# Patient Record
Sex: MALE | Race: WHITE | NOT HISPANIC OR LATINO | Employment: FULL TIME | URBAN - METROPOLITAN AREA
[De-identification: names, ages, dates, MRNs, and addresses within clinical notes are randomized per-mention and may not be internally consistent; named-entity substitution may affect disease eponyms.]

---

## 2018-05-29 ENCOUNTER — OFFICE VISIT (OUTPATIENT)
Dept: PRIMARY CARE CLINIC | Facility: CLINIC | Age: 33
End: 2018-05-29
Payer: COMMERCIAL

## 2018-05-29 VITALS
BODY MASS INDEX: 29.32 KG/M2 | HEART RATE: 77 BPM | RESPIRATION RATE: 12 BRPM | SYSTOLIC BLOOD PRESSURE: 123 MMHG | WEIGHT: 221.25 LBS | HEIGHT: 73 IN | OXYGEN SATURATION: 97 % | TEMPERATURE: 99 F | DIASTOLIC BLOOD PRESSURE: 76 MMHG

## 2018-05-29 DIAGNOSIS — Z00.00 ANNUAL PHYSICAL EXAM: ICD-10-CM

## 2018-05-29 DIAGNOSIS — E78.5 HYPERLIPIDEMIA, UNSPECIFIED HYPERLIPIDEMIA TYPE: ICD-10-CM

## 2018-05-29 DIAGNOSIS — E66.3 OVERWEIGHT (BMI 25.0-29.9): ICD-10-CM

## 2018-05-29 DIAGNOSIS — R74.01 ELEVATED ALT MEASUREMENT: ICD-10-CM

## 2018-05-29 DIAGNOSIS — M25.50 PAIN IN JOINT INVOLVING MULTIPLE SITES: ICD-10-CM

## 2018-05-29 DIAGNOSIS — R10.13 EPIGASTRIC PAIN: Primary | ICD-10-CM

## 2018-05-29 PROBLEM — J45.990 EXERCISE-INDUCED ASTHMA: Status: ACTIVE | Noted: 2018-05-29

## 2018-05-29 PROBLEM — J30.89 ENVIRONMENTAL AND SEASONAL ALLERGIES: Status: ACTIVE | Noted: 2018-05-29

## 2018-05-29 PROCEDURE — 3008F BODY MASS INDEX DOCD: CPT | Mod: CPTII,S$GLB,, | Performed by: NURSE PRACTITIONER

## 2018-05-29 PROCEDURE — 99999 PR PBB SHADOW E&M-NEW PATIENT-LVL IV: CPT | Mod: PBBFAC,,, | Performed by: NURSE PRACTITIONER

## 2018-05-29 PROCEDURE — 99203 OFFICE O/P NEW LOW 30 MIN: CPT | Mod: S$GLB,,, | Performed by: NURSE PRACTITIONER

## 2018-05-29 RX ORDER — IBUPROFEN 200 MG
200 TABLET ORAL
COMMUNITY

## 2018-05-29 NOTE — PROGRESS NOTES
"Subjective:       Patient ID: Aron Thao is a 33 y.o. male.    Chief Complaint: Abdominal Pain (Pulled muscle ,possible hiernia )    Patient is a 33 year old male who presents to clinic today requesting to establish as a primary care patient and to undergo his annual physical exam however, he has acute complaints of "hernia" of mid-abdomen.  He states he thought he pulled a muscle but has noted pain in his mid upper abdomen and also just above his umbilicus.  He has been experiencing pain intermittently for over a year worse in the last 2 months.  He does experience nausea at times with the pain and localized tenderness with "swelling" above his umbilicus but does not vomit and has had no changes in his bowel or bladder habits.  He denies any known blood in stool or urine.  No known trauma. He cannot identify any specific food trigger or GERD symptoms related to his epigastric pain.  He reports he has been routinely taking Ibuprofen for joint and muscle pain. He states he has joint pain daily.  He voices some concerns regarding his sister recently being diagnosed with Ehler Danlos syndrome.  He has a history of tendonitis of right shoulder but, no dermatologic complaints, no diagnosis of kyphosis or scoliosis, no joint hypermobility.  He denies any other acute complaints.     He was previously followed by Dr. Rahman for HLP but was lost to follow up.  He is due for labs.  He was previously followed by Dr. Osman for exercise induced asthma. He has not been using any asthma medications and has not experiences symptoms in years.  He has a history of allergies which are remedied by OTC supplements.  He has a history of elevated CRP and ALT. 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.  He is  and has 4 children living in [place].          Review of Systems   Constitutional: Negative.    HENT: Negative.    Eyes: Negative.    Respiratory: Negative.    Cardiovascular: Negative.    Gastrointestinal: Positive for " abdominal distention, abdominal pain and nausea. Negative for anal bleeding, blood in stool, constipation, diarrhea, rectal pain and vomiting.        Mid abdomen localized pain and swelling    Endocrine: Negative.    Genitourinary: Negative.    Musculoskeletal: Positive for arthralgias, joint swelling (ankle ) and myalgias. Negative for back pain, gait problem, neck pain and neck stiffness.        Ankles, shoulders, knees, wrists.     Skin: Negative.    Allergic/Immunologic: Positive for environmental allergies. Negative for food allergies and immunocompromised state.   Neurological: Negative.    Hematological: Negative.    Psychiatric/Behavioral: Negative.    All other systems reviewed and are negative.      Objective:      Physical Exam   Constitutional: He is oriented to person, place, and time. He appears well-developed and well-nourished. No distress.   HENT:   Head: Normocephalic and atraumatic.   Right Ear: External ear normal.   Left Ear: External ear normal.   Nose: Nose normal.   Mouth/Throat: Oropharynx is clear and moist. No oropharyngeal exudate.   Eyes: Conjunctivae are normal. Right eye exhibits no discharge. Left eye exhibits no discharge. No scleral icterus.   Neck: Normal range of motion. Neck supple.   Cardiovascular: Normal rate, regular rhythm, normal heart sounds and intact distal pulses.    No murmur heard.  Pulmonary/Chest: Effort normal and breath sounds normal. No respiratory distress.   Abdominal: Soft. Bowel sounds are normal. He exhibits no distension and no mass. There is tenderness. There is no rebound and no guarding. A hernia (approx 2cm separation of abdominal wall muscles mid abdomen superior to umbilicus consistent with small ventral hernia.  Mild pain with deep palpation.  No obvious buldge or mass. ) is present.   Musculoskeletal: Normal range of motion. He exhibits no edema, tenderness or deformity.   Lymphadenopathy:     He has no cervical adenopathy.   Neurological: He is  alert and oriented to person, place, and time. No cranial nerve deficit. He exhibits normal muscle tone. Coordination normal.   Skin: Skin is warm and dry. Capillary refill takes less than 2 seconds. No rash noted. He is not diaphoretic. No erythema.   No skin laxity   Psychiatric: He has a normal mood and affect. His behavior is normal.   Nursing note and vitals reviewed.      Assessment:       1. Epigastric pain    2. Pain in joint involving multiple sites    3. Elevated ALT measurement    4. Hyperlipidemia, unspecified hyperlipidemia type    5. Overweight (BMI 25.0-29.9)    6. Annual physical exam        Plan:       Epigastric pain  -     US Abdomen Complete; Future; Expected date: 05/29/2018  -     Amylase; Future; Expected date: 05/29/2018  -     Lipase; Future; Expected date: 05/29/2018  Patient verbalizes intermittent epigastric pain and above umbilicus.  Likely not related.  Given history of elevated ALT and routine use of NSAIDs will obtain US and labs.  Instructed patient if symptoms worsen in any way he should call for ASAp appt. Or report to the ED for immediate evaluation.  He was instructed to try to avoid use of NSAIDs and try to identify food trigger if any.  He may use OTC zantac for further symptom relief of epigastric pain and observe for effect.  He will be referred to general surgery for further evaluation for possible ventral hernia as appropriate.  Signs and symptoms of hernia incarceration discussed.   Pain in joint involving multiple sites  -     Rheumatoid factor; Future; Expected date: 05/29/2018  -     Sedimentation rate, manual; Future; Expected date: 05/29/2018  -     MARS; Future; Expected date: 05/29/2018    Elevated ALT measurement  -     CBC auto differential; Future; Expected date: 05/29/2018  -     Comprehensive metabolic panel; Future; Expected date: 05/29/2018  -     Lipid panel; Future; Expected date: 05/29/2018  -     URINALYSIS; Future; Expected date: 05/29/2018  -     US  Abdomen Complete; Future; Expected date: 05/29/2018  -     Amylase; Future; Expected date: 05/29/2018  -     Lipase; Future; Expected date: 05/29/2018    Hyperlipidemia, unspecified hyperlipidemia type  -     CBC auto differential; Future; Expected date: 05/29/2018  -     Comprehensive metabolic panel; Future; Expected date: 05/29/2018  -     Lipid panel; Future; Expected date: 05/29/2018  -     URINALYSIS; Future; Expected date: 05/29/2018    Overweight (BMI 25.0-29.9)  -     CBC auto differential; Future; Expected date: 05/29/2018  -     Comprehensive metabolic panel; Future; Expected date: 05/29/2018  -     Lipid panel; Future; Expected date: 05/29/2018  -     URINALYSIS; Future; Expected date: 05/29/2018    Annual physical exam  -     CBC auto differential; Future; Expected date: 05/29/2018  -     Comprehensive metabolic panel; Future; Expected date: 05/29/2018  -     Lipid panel; Future; Expected date: 05/29/2018  -     URINALYSIS; Future; Expected date: 05/29/2018      Medication List with Changes/Refills   Current Medications    IBUPROFEN (ADVIL,MOTRIN) 200 MG TABLET    Take 200 mg by mouth as needed for Pain (headaches).   Discontinued Medications    ALBUTEROL 90 MCG/ACTUATION HFAA    Inhale 2 puffs into the lungs every 6 (six) hours as needed.    BECLOMETHASONE (QVAR) 80 MCG/ACTUATION AERO    Inhale 1 puff into the lungs 2 (two) times daily.    LEVOCETIRIZINE (XYZAL) 5 MG TABLET    Take 1 tablet (5 mg total) by mouth every evening.    PRAVASTATIN (PRAVACHOL) 40 MG TABLET    Take 1 tablet (40 mg total) by mouth once daily.         I have reviewed the patient's past medical/surgical and social histories and updated as appropriate. Medications were reviewed and discussed as appropriate including side effects and risks versus benefit. Plan of care was reviewed and agreed upon with the patient.  An opportunity to ask questions was provided and explanation given. Patient verbalized understanding on all  information reviewed and discussed.  The patient will follow up  2 weeks with labs or sooner if needed. If symptoms worsen patient may call for ASAP appointment or report to the emergency department for further evaluation.

## 2018-05-29 NOTE — PATIENT INSTRUCTIONS
Hernia (Adult)    A hernia can happen when there is a weakness or defect in the wall of the abdomen or groin. Intestines or nearby tissues may move from their usual location and push through the weakness in the wall. This can cause a hernia (bulge) you may see or feel.  Causes and Risk Factors   A hernia may be present at birth. Or it may be caused by the wear and tear of daily living. Certain factors can make a hernia more likely. These can include:  · Heavy lifting  · Straining, whether from lifting, movement, or constipation  · Chronic cough  · Injury to the abdominal wall  · Excess weight  · Pregnancy  · Prior surgery  · Older age  · Family history of hernia  Symptoms  Symptoms of a hernia may come on suddenly. Or they may appear slowly over time. Some common symptoms include:  · Bulge in the groin area, around the navel, or in the scrotum (the bulge may get bigger when you stand and go away when you lie down)  · Pain or pressure around the bulge  · Pain during activities such as lifting, coughing, or sneezing  · A feeling of weakness or pressure in the groin  · Pain or swelling in the scrotum  Types of hernias  There are different types of hernia. The type you have depends on its location:  · Inguinal: This type is in the groin or scrotum. It is more common in men.  · Femoral: This type is in the groin, upper thigh (where the leg bends), or labia. It is more common in women.  · Ventral: This type is in the abdominal wall.  · Umbilical: This type occurs around the navel (belly button).  · Incisional: This type occurs at the site of a previous surgery.  The condition of the hernia can help determine how urgently it needs to be treated.  · Reducible: It goes back in by itself, or it can be pushed back in.  · Irreducible: It cant be pushed back in.  · Incarcerated/Strangulated: The intestine is trapped (incarcerated). If this happens, you wont be able to push the bulge back in. If the incarcerated hernia isnt  treated, it may become strangulated. This means the area loses blood supply and the tissue may die. This requires emergency surgery! Treatment is needed right away!  In most cases, a hernia will not heal on its own. Surgery is usually needed to repair the defect in the abdominal wall or groin. Youll be told more about surgery, if needed.  If your symptoms are not severe, treatment may sometimes be delayed. In such cases, regular follow-up visits with the provider will be needed. Youll be asked to keep track of your symptoms and to watch for signs of more serious problems. You may also be given guidelines similar to the home care instructions below.  Home Care  To help keep a hernia from getting worse, you may be advised to:  · Avoid heavy lifting and straining as directed.  · Take steps to prevent constipation, such as eating more fiber and drinking more water. This may help reduce straining that can occur when having a bowel movement. Reducing straining may help keep your symptoms from getting worse.  · Maintain a healthy weight or lose excess weight. This can help reduce strain on abdominal muscles and tissues.  · Stop smoking. This can help prevent coughing that may also strain abdominal muscles and tissues.  Follow-up care  Follow up with your healthcare provider, or as directed. If imaging tests were done, they will be reviewed a doctor. You will be told the results and any new findings that may affect your care.  When to seek medical advice  Call your healthcare provider right away if any of these occur:  · Hernia hardens, swells, or grows larger  · Hernia can no longer be pushed back in  · Pain moves to the lower right abdomen (just below the waistline), or spreads to the back  Call 911  Call 911 right away if any of these occur:  · Nausea and vomiting  · Severe pain, redness, or tenderness in the area near the hernia  · Pain worsens quickly and doesnt get better  · Inability to have a bowel movement or  pass gas  · Fever of 100.4°F (38°C) or higher  · Trouble breathing  · Fainting  · Rapid heart rate  · Vomiting blood  · Large amounts of blood in stool  Date Last Reviewed: 6/9/2015  © 7154-2484 [employer]. [address] [place] [address], [place] [address]. All rights reserved. This information is not intended as a substitute for professional medical care. Always follow your healthcare professional's instructions.        Understanding Lateral Ankle Ligament Reconstruction  Lateral ankle ligament reconstruction is a surgery to tighten and firm up one or more ligaments on the outside of the ankle. Its also known as the Brostrom procedure. Its most often done as an outpatient surgery. This means you can go home the same day.  How the ankle works  Your ankle is a hinge joint. It lets your foot move up and down, and from side to side. Your foot and ankle have several ligaments. These are strong bands of tissue that keep the bones in your ankle and feet tightly connected. On the outer side of your foot, you have several ligaments. These include the anterior talofibular ligament (ATFL), the calcaneofibular ligament (CFL), and the posterior talofibular ligament (PTFL). These help keep your ankle and foot steady when you walk.  Your ligaments can get weak and loose. This can happen if you have repeated ankle sprains. It can also happen if your foot or ankle is shaped different from a normal foot/ankle. Weak, loose ligaments can make your ankle become unstable.  Why lateral ankle ligament reconstruction is done  You may need this surgery if one or more of the ligaments on the outside of your ankle are loose or stretched. This leads to a condition called chronic ankle instability. It can cause chronic pain, repeated ankle sprains, and an ankle that often gives way when you walk or perform activities.  At first an ankle sprain may stretch and partially tear your ankle ligaments. This first sprain makes it more likely  that you will sprain your ankle again. This is more likely if you did not have the first sprain treated properly. Additional sprains may loosen your ligaments even more.  Certain problems with your foot can make you more likely to have an unstable ankle. These include:  · Foot alignment problem (hindfoot varus)  · A big toe that sits lower than the other toes (plantar flexion of the first ray)  · Arch larger than normal (midfoot cavus)  · General looseness of your ligaments, such as from Rose Mary-Danlos syndrome  You may have already been treated with physical therapy and special foot inserts. A healthcare provider may advise surgery if other treatments for your ankle havent worked. Its not common to need this surgery right after a first ankle sprain.  How lateral ankle ligament reconstruction is done  Your procedure will be done by an orthopedic surgeon. This is a doctor who specializes in surgery of bones, ligaments, and tendons. The surgery can be done in several ways. The surgeon will make a cut (incision) through the skin and muscle of the ankle. If your surgery is minimally invasive, your surgeon will make a small incision. He or she will put small tools and a tiny camera through the incision. The surgeon will remove the ATFL and CFL ankle ligaments from where they attach on a bone of the lower leg (fibula). These ligaments may be made shorter. The ligaments are then reattached to the fibula, using small new holes drilled into the bone.  Risks of lateral ankle ligament reconstruction  Every surgery has risks. Risks of this surgery include:  · Too much bleeding  · Nerve damage  · Infection  · Stiffness in your ankle joint  · Blood clot  · Problems from anesthesia  · Your ankle stability does not get better  Your own risk for complications depends on your age, the anatomy of your foot, and your general health. Talk with your healthcare provider about any concerns you might have. He or she can tell you which  risks apply most to you.  Date Last Reviewed: 4/1/2017 © 2000-2017 [employer]. [address] [place] [address], [place] [address]. All rights reserved. This information is not intended as a substitute for professional medical care. Always follow your healthcare professional's instructions.        Treatment for Keratoconus    Keratoconus is an eye disorder where your cornea thins slowly over time. The cornea also bulges out to form a cone-like shape. It is not a common condition. It happens more often in young adults in their teens and 20s.  Types of treatment  Treatment options vary depending on how severe the condition is. They may also vary depending on what type of keratoconus you have.  Early on you may only need to wear glasses to correct your vision. If glasses dont correct your vision, you may be given special contact lenses. These contact lenses must be carefully fitted to your cornea.  Many people with keratoconus will not need any other treatment. But if your cornea becomes too scarred or you are not able to wear contact lenses, you may need surgery. There are several types that may be done:  · Corneal transplant. This surgery removes part or all of the cornea. Its replaced with a cornea from a cadaver donor.  · Corneal ring implants. These are artificial rings placed inside your cornea to improve eyesight.  · Artificial lenses. These are placed inside your eye to improve eyesight.  Each of these procedures has its own risks and benefits. Ask your eye care doctor about what treatment is best for you.  Possible complications of keratoconus  In rare cases, severe keratoconus can cause a complication called corneal hydrops. This happens when part of your cornea breaks. This causes the fluid beneath your eye to flow into your cornea. This can cause severe pain and swelling. It may also cause sudden eyesight loss. You may need to wear special contact lenses or use special eye drops if you have corneal  hydrops. Corneal hydrops often goes away in several weeks.  Preventing keratoconus  Most cases of keratoconus cannot be prevented. But you may be able to reduce your chance of getting keratoconus by:  · Protecting your eyes from the sun with sunglasses  · Making sure your contact lenses fit well  · Getting treatment for any kind of eye discomfort  · Not rubbing your eyes  When to call the healthcare provider  Call your healthcare provider right away if you have any of these:  · Sudden pain in your eye  · Sudden eyesight loss   Date Last Reviewed: 8/10/2015  © 6725-3189 [employer]. [address] [place] [address]. All rights reserved. This information is not intended as a substitute for professional medical care. Always follow your healthcare professional's instructions.

## 2018-06-05 ENCOUNTER — PATIENT MESSAGE (OUTPATIENT)
Dept: PRIMARY CARE CLINIC | Facility: CLINIC | Age: 33
End: 2018-06-05

## 2018-06-07 ENCOUNTER — HOSPITAL ENCOUNTER (OUTPATIENT)
Dept: RADIOLOGY | Facility: HOSPITAL | Age: 33
Discharge: HOME | End: 2018-06-07
Attending: NURSE PRACTITIONER
Payer: COMMERCIAL

## 2018-06-07 DIAGNOSIS — R10.13 EPIGASTRIC PAIN: ICD-10-CM

## 2018-06-07 DIAGNOSIS — R74.01 ELEVATED ALT MEASUREMENT: ICD-10-CM

## 2018-06-07 PROCEDURE — 76700 US EXAM ABDOM COMPLETE: CPT | Mod: TC

## 2018-06-07 PROCEDURE — 76700 US EXAM ABDOM COMPLETE: CPT | Mod: 26,,, | Performed by: RADIOLOGY

## 2018-06-12 ENCOUNTER — OFFICE VISIT (OUTPATIENT)
Dept: PRIMARY CARE CLINIC | Facility: CLINIC | Age: 33
End: 2018-06-12
Payer: COMMERCIAL

## 2018-06-12 VITALS
BODY MASS INDEX: 29.22 KG/M2 | HEART RATE: 78 BPM | DIASTOLIC BLOOD PRESSURE: 73 MMHG | HEIGHT: 73 IN | SYSTOLIC BLOOD PRESSURE: 120 MMHG | RESPIRATION RATE: 12 BRPM | OXYGEN SATURATION: 96 % | WEIGHT: 220.44 LBS | TEMPERATURE: 98 F

## 2018-06-12 DIAGNOSIS — E78.2 MIXED HYPERLIPIDEMIA: Primary | ICD-10-CM

## 2018-06-12 DIAGNOSIS — E66.3 OVERWEIGHT (BMI 25.0-29.9): ICD-10-CM

## 2018-06-12 DIAGNOSIS — R74.01 ELEVATED ALT MEASUREMENT: ICD-10-CM

## 2018-06-12 DIAGNOSIS — Z23 NEED FOR TDAP VACCINATION: ICD-10-CM

## 2018-06-12 PROCEDURE — 99213 OFFICE O/P EST LOW 20 MIN: CPT | Mod: 25,S$GLB,, | Performed by: NURSE PRACTITIONER

## 2018-06-12 PROCEDURE — 3008F BODY MASS INDEX DOCD: CPT | Mod: CPTII,S$GLB,, | Performed by: NURSE PRACTITIONER

## 2018-06-12 PROCEDURE — 99999 PR PBB SHADOW E&M-EST. PATIENT-LVL III: CPT | Mod: PBBFAC,,, | Performed by: NURSE PRACTITIONER

## 2018-06-12 PROCEDURE — 90715 TDAP VACCINE 7 YRS/> IM: CPT | Mod: S$GLB,,, | Performed by: NURSE PRACTITIONER

## 2018-06-12 PROCEDURE — 90471 IMMUNIZATION ADMIN: CPT | Mod: S$GLB,,, | Performed by: NURSE PRACTITIONER

## 2018-06-12 NOTE — PATIENT INSTRUCTIONS
Low-Fat Cooking Tips  To eat less fat, you may need to learn some new ways to cook. But that doesn't mean you have to eat bland, boring food. And it doesn't mean cooking needs to take any more time. Here are some tips for cooking and seasoning foods with less fat.     Broil fish instead of frying it. And sprinkle on herbs to add flavor.    Try New Cooking Methods  · Broil, roast, bake, steam, or microwave fish, chicken, turkey, and meat.  · Remove skin from chicken and turkey and trim extra fat from meat before cooking.  · Sprinkle herbs on meat, chicken, and fish, and in soups.  · Cook in broth instead of fat.  · Use nonstick cooking sprays or nonstick pans.  · Steam or microwave vegetables without adding fat. Serve with herbs, lemon juice, vinegar, or fat-free butter-flavored powder.  · To flavor beans and rice, add chopped onions, garlic, and peppers.  · Chill soups and stews. Before reheating and serving, skim off the fat.  · When you add fat, use canola or olive oil instead of butter or lard.  Lighten Up Your Recipes  · In soups and sauces: Replace whole milk or cream with low-fat milk, evaporated fat-free milk, or nonfat dry milk.  · In puddings and other desserts: Replace whole milk or cream with low-fat milk or fat-free condensed milk.  · To make dips and toppings: Use low-fat or nonfat cottage cheese or sour cream.  · To make salad dressings: Use nonfat yogurt or low-fat buttermilk.  · In place of 1 whole egg in recipes: Use 2 egg whites or 1/4 cup egg substitute.  · In place of regular cheese: Use fat-free or reduced-fat cheese.  Date Last Reviewed: 5/11/2015  © 5747-6123 [employer]. [address] [place] [address], [place] [address]. All rights reserved. This information is not intended as a substitute for professional medical care. Always follow your healthcare professional's instructions.

## 2018-06-12 NOTE — PROGRESS NOTES
"Subjective:       Patient ID: Aron Thao is a 33 y.o. male.    Chief Complaint: Follow-up 2 wk    Patient presents to clinic today for f/u after undergoing diagnostics for complaints of recurrent intermittent mid abdominal pain that he has been experiencing for the past year worse in the last few months.  At his last visit he requested to establish as a primary care patient and his EHR records were briefly reviewed. It was noted that he was previously followed by Dr. Rahman for HLP but was lost to follow up.  He was due for labs and orders were placed.  He was previously followed by Dr. Osman for exercise induced asthma but reported he had not been using any asthma medications and had not experienced symptoms in years.  He reported a history of seasonal and environmental allergies which are remedied by OTC supplements.  Records revealed a history of elevated CRP and ALT.  He voiced concerns regarding his sister recently being diagnosed with Ehler Danlos syndrome.  He has a history of tendonitis of right shoulder but, no dermatologic complaints, no diagnosis of kyphosis or scoliosis, no joint hypermobility.  He did undergo labs and abdominal US prior to this visit which will be reviewed with him at this visit.    Today, he reports he is doing well and his pain remains intermittent and without change.  He states "it only happens when his children push on his stomach and he thinks he has been experiencing it about 10 years when he thinks about it.  He has not acute complaints or symptoms.     Pt. Reports he did go to his eye specialist and was instructed his headaches were related to not wearing his glasses.  He has glasses ordered.  He will have his provider send a copy of his exam for our records.       Review of Systems   Constitutional: Negative.    HENT: Negative.    Respiratory: Negative.    Cardiovascular: Negative.    Gastrointestinal: Positive for abdominal pain. Negative for abdominal distention, " blood in stool, constipation, diarrhea, nausea and vomiting.   Genitourinary: Negative.    Musculoskeletal: Negative.    Allergic/Immunologic: Positive for environmental allergies. Negative for food allergies and immunocompromised state.   Neurological: Negative.    Hematological: Negative.    Psychiatric/Behavioral: Negative.    All other systems reviewed and are negative.      Objective:      Physical Exam   Constitutional: He is oriented to person, place, and time. He appears well-developed and well-nourished.   HENT:   Head: Normocephalic and atraumatic.   Eyes: Conjunctivae are normal. Pupils are equal, round, and reactive to light. Right eye exhibits no discharge. Left eye exhibits no discharge. No scleral icterus.   Cardiovascular: Normal rate and regular rhythm.    Pulmonary/Chest: Effort normal.   Abdominal: Soft. He exhibits no distension. There is no tenderness.   Neurological: He is alert and oriented to person, place, and time.   Skin: Skin is warm and dry. Capillary refill takes less than 2 seconds. No pallor.   Psychiatric: He has a normal mood and affect. His behavior is normal.   Nursing note and vitals reviewed.         Diagnostics reviewed with patient.   EXAMINATION:  US ABDOMEN COMPLETE    CLINICAL HISTORY:  Epigastric pain    TECHNIQUE:  Complete abdominal ultrasound (including pancreas, liver, gallbladder, common bile duct, spleen, aorta, IVC, and kidneys) was performed.    COMPARISON:  None    FINDINGS:  The pancreas is not well seen.  The aorta is normal caliber.  The IVC is unremarkable.    The gallbladder is within normal limits.  The common bile duct is normal in caliber at 4 mm.  Sonographic Lino sign is negative.    The liver is normal in size without focal lesion.  The spleen is normal in size measuring 11.6 cm in length.    The right and left kidneys are normal in size measuring 11.3 and 11.8 cm in length, respectively.  There is no hydronephrosis.   Impression       Unremarkable  abdominal ultrasound.      Electronically signed by: Ronnie To MD  Date: 06/07/2018  Time: 09:08     Sed rate, Amylase, Lipase, MARS, RF all negative     Lab Results   Component Value Date    WBC 7.40 06/07/2018    HGB 14.0 06/07/2018    HCT 41.8 06/07/2018     06/07/2018    CHOL 194 06/07/2018    TRIG 72 06/07/2018    HDL 41 06/07/2018    ALT 33 06/07/2018    AST 24 06/07/2018     06/07/2018    K 4.3 06/07/2018     06/07/2018    CREATININE 0.9 06/07/2018    BUN 19 06/07/2018    CO2 25 06/07/2018    TSH 2.381 05/24/2013     Assessment:       1. Mixed hyperlipidemia    2. Overweight (BMI 25.0-29.9)    3. Elevated ALT measurement    4. Need for Tdap vaccination        Plan:       Mixed hyperlipidemia  Doing well.  TC and LDL are minimally above goals of Tc<200 and LDL <100.  Pt. Was counseled on the need to adhere to low fat low cholesterol diet.  He will continue his routine exercise and diet modifications.  He is continuing to run and play volleyball.   Overweight (BMI 25.0-29.9)  Continue TLCs.  BMI goal of 25 was reviewed with pt.   Elevated ALT measurement  Resolved.  Will continue to monitor as necessary.   Need for Tdap vaccination  -     (In Office Administered) Tdap Vaccine  Up date today.     Medication List with Changes/Refills   Current Medications    IBUPROFEN (ADVIL,MOTRIN) 200 MG TABLET    Take 200 mg by mouth as needed for Pain (headaches).           I have reviewed the patient's past medical/surgical and social histories and updated as appropriate. Medications were reviewed and discussed as appropriate including side effects and risks versus benefit.     Plan of care was reviewed and agreed upon with the patient.  An opportunity to ask questions was provided and explanation given. Patient verbalized understanding on all information reviewed and discussed.  The patient will follow up at his/her routinely scheduled appointment with PCP or sooner if needed. If symptoms worsen  patient may call for ASAP appointment or report to the emergency department for further evaluation.

## 2018-06-12 NOTE — PROGRESS NOTES
2 patient identifiers used, (name & ), order checked, allergies checked, TDAP given per order using aseptic technique ,patient tolerated well, 0/10 pain scale , no bleeding at insertion site; no adverse effects noted.

## 2019-01-02 ENCOUNTER — OFFICE VISIT (OUTPATIENT)
Dept: PRIMARY CARE CLINIC | Facility: CLINIC | Age: 34
End: 2019-01-02
Payer: COMMERCIAL

## 2019-01-02 VITALS
HEART RATE: 68 BPM | TEMPERATURE: 99 F | BODY MASS INDEX: 28.15 KG/M2 | RESPIRATION RATE: 18 BRPM | DIASTOLIC BLOOD PRESSURE: 73 MMHG | WEIGHT: 212.44 LBS | OXYGEN SATURATION: 97 % | HEIGHT: 73 IN | SYSTOLIC BLOOD PRESSURE: 118 MMHG

## 2019-01-02 DIAGNOSIS — J30.89 ENVIRONMENTAL AND SEASONAL ALLERGIES: ICD-10-CM

## 2019-01-02 DIAGNOSIS — R05.9 COUGH: ICD-10-CM

## 2019-01-02 DIAGNOSIS — R50.9 FEVER, UNSPECIFIED FEVER CAUSE: ICD-10-CM

## 2019-01-02 DIAGNOSIS — J01.90 ACUTE NON-RECURRENT SINUSITIS, UNSPECIFIED LOCATION: Primary | ICD-10-CM

## 2019-01-02 LAB
CTP QC/QA: YES
FLUAV AG NPH QL: NEGATIVE
FLUBV AG NPH QL: NEGATIVE

## 2019-01-02 PROCEDURE — 3008F PR BODY MASS INDEX (BMI) DOCUMENTED: ICD-10-PCS | Mod: CPTII,S$GLB,, | Performed by: NURSE PRACTITIONER

## 2019-01-02 PROCEDURE — 99214 OFFICE O/P EST MOD 30 MIN: CPT | Mod: S$GLB,,, | Performed by: NURSE PRACTITIONER

## 2019-01-02 PROCEDURE — 87804 INFLUENZA ASSAY W/OPTIC: CPT | Mod: QW,S$GLB,, | Performed by: NURSE PRACTITIONER

## 2019-01-02 PROCEDURE — 99999 PR PBB SHADOW E&M-EST. PATIENT-LVL III: ICD-10-PCS | Mod: PBBFAC,,, | Performed by: NURSE PRACTITIONER

## 2019-01-02 PROCEDURE — 87804 POCT INFLUENZA A/B: ICD-10-PCS | Mod: 59,QW,S$GLB, | Performed by: NURSE PRACTITIONER

## 2019-01-02 PROCEDURE — 99999 PR PBB SHADOW E&M-EST. PATIENT-LVL III: CPT | Mod: PBBFAC,,, | Performed by: NURSE PRACTITIONER

## 2019-01-02 PROCEDURE — 3008F BODY MASS INDEX DOCD: CPT | Mod: CPTII,S$GLB,, | Performed by: NURSE PRACTITIONER

## 2019-01-02 PROCEDURE — 99214 PR OFFICE/OUTPT VISIT, EST, LEVL IV, 30-39 MIN: ICD-10-PCS | Mod: S$GLB,,, | Performed by: NURSE PRACTITIONER

## 2019-01-02 RX ORDER — ALBUTEROL SULFATE 90 UG/1
2 AEROSOL, METERED RESPIRATORY (INHALATION) EVERY 6 HOURS PRN
Qty: 18 G | Refills: 0 | Status: SHIPPED | OUTPATIENT
Start: 2019-01-02 | End: 2020-10-20

## 2019-01-02 RX ORDER — AMOXICILLIN AND CLAVULANATE POTASSIUM 875; 125 MG/1; MG/1
1 TABLET, FILM COATED ORAL EVERY 12 HOURS
Qty: 14 TABLET | Refills: 0 | Status: SHIPPED | OUTPATIENT
Start: 2019-01-02 | End: 2019-01-09

## 2019-01-02 RX ORDER — PROMETHAZINE HYDROCHLORIDE AND DEXTROMETHORPHAN HYDROBROMIDE 6.25; 15 MG/5ML; MG/5ML
5 SYRUP ORAL
Qty: 118 ML | Refills: 0 | Status: SHIPPED | OUTPATIENT
Start: 2019-01-02 | End: 2019-01-12

## 2019-01-02 NOTE — PATIENT INSTRUCTIONS

## 2019-01-02 NOTE — PROGRESS NOTES
Subjective:       Patient ID: Aron Thao is a 33 y.o. male.    Chief Complaint: Cough and Nasal Congestion    Cough   This is a new problem. The current episode started 1 to 4 weeks ago. The problem has been gradually worsening. The cough is productive of sputum. Associated symptoms include ear congestion, ear pain, a fever, headaches, nasal congestion, postnasal drip, rhinorrhea, a sore throat and wheezing. Pertinent negatives include no chest pain, chills, heartburn, myalgias, rash, shortness of breath, sweats or weight loss. The symptoms are aggravated by lying down. He has tried OTC cough suppressant (OTC nasal spray and mucinex sinus OTC) for the symptoms. The treatment provided no relief. His past medical history is significant for asthma and environmental allergies. There is no history of bronchiectasis, bronchitis, COPD, emphysema or pneumonia.   Sinus Problem   This is a new problem. The current episode started in the past 7 days. The problem has been gradually worsening since onset. The maximum temperature recorded prior to his arrival was 100.4 - 100.9 F. His pain is at a severity of 0/10. He is experiencing no pain. Associated symptoms include congestion, coughing, ear pain, headaches, a hoarse voice, sinus pressure, sneezing and a sore throat. Pertinent negatives include no chills, diaphoresis, neck pain, shortness of breath or swollen glands. Past treatments include oral decongestants, spray decongestants and lying down. The treatment provided no relief.     Review of Systems   Constitutional: Positive for fatigue and fever. Negative for chills, diaphoresis and weight loss.   HENT: Positive for congestion, ear pain, hoarse voice, postnasal drip, rhinorrhea, sinus pressure, sinus pain, sneezing and sore throat. Negative for facial swelling.    Eyes: Negative.    Respiratory: Positive for cough and wheezing. Negative for shortness of breath.    Cardiovascular: Negative.  Negative for chest pain.    Gastrointestinal: Negative.  Negative for diarrhea, heartburn, nausea and vomiting.   Genitourinary: Negative.  Negative for difficulty urinating, dysuria and frequency.   Musculoskeletal: Negative.  Negative for myalgias and neck pain.   Skin: Positive for pallor. Negative for rash.   Allergic/Immunologic: Positive for environmental allergies.   Neurological: Positive for headaches. Negative for dizziness, weakness, light-headedness and numbness.   Hematological: Negative.    Psychiatric/Behavioral: Negative.    All other systems reviewed and are negative.      Objective:      Physical Exam   Constitutional: He is oriented to person, place, and time. He appears well-developed and well-nourished. No distress.   Appears fatigued    HENT:   Head: Normocephalic and atraumatic.   Right Ear: External ear and ear canal normal.   Left Ear: Tympanic membrane, external ear and ear canal normal.   Nose: Mucosal edema and rhinorrhea present. Right sinus exhibits no maxillary sinus tenderness and no frontal sinus tenderness. Left sinus exhibits no maxillary sinus tenderness and no frontal sinus tenderness.   Mouth/Throat: Uvula is midline and mucous membranes are normal. Posterior oropharyngeal edema and posterior oropharyngeal erythema present. No oropharyngeal exudate.   Eyes: Conjunctivae are normal. Pupils are equal, round, and reactive to light. Right eye exhibits no discharge. Left eye exhibits no discharge. No scleral icterus.   Neck: Normal range of motion. Neck supple. No thyromegaly present.   Cardiovascular: Normal rate, regular rhythm, normal heart sounds and intact distal pulses.   Pulmonary/Chest: Effort normal and breath sounds normal. No respiratory distress. He has no wheezes. He has no rales.   Abdominal: Soft. Bowel sounds are normal. He exhibits no distension. There is no tenderness.   Musculoskeletal: Normal range of motion. He exhibits no edema.   Lymphadenopathy:     He has no cervical adenopathy.    Neurological: He is alert and oriented to person, place, and time. No cranial nerve deficit.   Skin: Skin is warm and dry. Capillary refill takes less than 2 seconds. No rash noted. He is not diaphoretic. No erythema. There is pallor.   Psychiatric: He has a normal mood and affect. His behavior is normal.   Nursing note and vitals reviewed.      Influenza A and B is negative today   Assessment:       1. Acute non-recurrent sinusitis, unspecified location    2. Fever, unspecified fever cause    3. Cough    4. Environmental and seasonal allergies        Plan:       Acute non-recurrent sinusitis, unspecified location  -     amoxicillin-clavulanate 875-125mg (AUGMENTIN) 875-125 mg per tablet; Take 1 tablet by mouth every 12 (twelve) hours. for 7 days  Dispense: 14 tablet; Refill: 0  -     promethazine-dextromethorphan (PROMETHAZINE-DM) 6.25-15 mg/5 mL Syrp; Take 5 mLs by mouth every 4 to 6 hours as needed (cough).  Dispense: 118 mL; Refill: 0  -     fluticasone (VERAMYST) 27.5 mcg/actuation nasal spray; 2 sprays by Nasal route once daily.  Dispense: 15.8 mL; Refill: 0  -     POCT Influenza A/B    Fever, unspecified fever cause  -     amoxicillin-clavulanate 875-125mg (AUGMENTIN) 875-125 mg per tablet; Take 1 tablet by mouth every 12 (twelve) hours. for 7 days  Dispense: 14 tablet; Refill: 0  -     promethazine-dextromethorphan (PROMETHAZINE-DM) 6.25-15 mg/5 mL Syrp; Take 5 mLs by mouth every 4 to 6 hours as needed (cough).  Dispense: 118 mL; Refill: 0  -     fluticasone (VERAMYST) 27.5 mcg/actuation nasal spray; 2 sprays by Nasal route once daily.  Dispense: 15.8 mL; Refill: 0  -     POCT Influenza A/B    Cough  -     amoxicillin-clavulanate 875-125mg (AUGMENTIN) 875-125 mg per tablet; Take 1 tablet by mouth every 12 (twelve) hours. for 7 days  Dispense: 14 tablet; Refill: 0  -     promethazine-dextromethorphan (PROMETHAZINE-DM) 6.25-15 mg/5 mL Syrp; Take 5 mLs by mouth every 4 to 6 hours as needed (cough).  Dispense:  118 mL; Refill: 0  -     fluticasone (VERAMYST) 27.5 mcg/actuation nasal spray; 2 sprays by Nasal route once daily.  Dispense: 15.8 mL; Refill: 0  -     albuterol (PROAIR HFA) 90 mcg/actuation inhaler; Inhale 2 puffs into the lungs every 6 (six) hours as needed for Wheezing or Shortness of Breath. Rescue  Dispense: 18 g; Refill: 0  -     POCT Influenza A/B    Environmental and seasonal allergies  -     albuterol (PROAIR HFA) 90 mcg/actuation inhaler; Inhale 2 puffs into the lungs every 6 (six) hours as needed for Wheezing or Shortness of Breath. Rescue  Dispense: 18 g; Refill: 0    Patient was instructed to increase fluids and rest today.  May use throat lozenge of their choice and OTC fever/pain reducer as per label instructions.  Warm salt water gargles with 1/4 tsp of salt with 8 oz of water 2-3 times per day.  May use OTC antihistamine of choice as per label instructions.      Medication List           Accurate as of 1/2/19 10:26 AM. If you have any questions, ask your nurse or doctor.               START taking these medications    albuterol 90 mcg/actuation inhaler  Commonly known as:  PROAIR HFA  Inhale 2 puffs into the lungs every 6 (six) hours as needed for Wheezing or Shortness of Breath. Rescue  Started by:  Ignacia Salazar DNP, NP-C     amoxicillin-clavulanate 875-125mg 875-125 mg per tablet  Commonly known as:  AUGMENTIN  Take 1 tablet by mouth every 12 (twelve) hours. for 7 days  Started by:  Ignacia Salazar DNP, NP-C     fluticasone 27.5 mcg/actuation nasal spray  Commonly known as:  VERAMYST  2 sprays by Nasal route once daily.  Started by:  Ignacia Salazar DNP, NP-C     promethazine-dextromethorphan 6.25-15 mg/5 mL Syrp  Commonly known as:  PROMETHAZINE-DM  Take 5 mLs by mouth every 4 to 6 hours as needed (cough).  Started by:  Ignacia Salazar DNP, NP-C        CONTINUE taking these medications    ibuprofen 200 MG tablet  Commonly known as:  ADVIL,MOTRIN           Where to Get Your  Medications      These medications were sent to [facility]/pharmacy #[id number] - [place] - [address]  [address] [place] [address]    Hours:  24-hours Phone:  [phone]   · albuterol 90 mcg/actuation inhaler  · amoxicillin-clavulanate 875-125mg 875-125 mg per tablet  · fluticasone 27.5 mcg/actuation nasal spray  · promethazine-dextromethorphan 6.25-15 mg/5 mL Syrp             I have reviewed the patient's past medical/surgical and social histories and updated as appropriate. Medications were reviewed and discussed as appropriate including side effects and risks versus benefit.     Plan of care was reviewed and agreed upon with the patient.  An opportunity to ask questions was provided and explanation given. Patient verbalized understanding on all information reviewed and discussed. If symptoms worsen patient may call for ASAP appointment or report to the emergency department for further evaluation.

## 2019-01-17 ENCOUNTER — OFFICE VISIT (OUTPATIENT)
Dept: PRIMARY CARE CLINIC | Facility: CLINIC | Age: 34
End: 2019-01-17
Payer: COMMERCIAL

## 2019-01-17 VITALS
RESPIRATION RATE: 18 BRPM | HEIGHT: 73 IN | BODY MASS INDEX: 28.33 KG/M2 | TEMPERATURE: 99 F | OXYGEN SATURATION: 97 % | SYSTOLIC BLOOD PRESSURE: 121 MMHG | HEART RATE: 87 BPM | WEIGHT: 213.75 LBS | DIASTOLIC BLOOD PRESSURE: 69 MMHG

## 2019-01-17 DIAGNOSIS — J30.89 ENVIRONMENTAL AND SEASONAL ALLERGIES: ICD-10-CM

## 2019-01-17 DIAGNOSIS — J32.9 RHINOSINUSITIS: Primary | ICD-10-CM

## 2019-01-17 DIAGNOSIS — J02.9 ACUTE PHARYNGITIS, UNSPECIFIED ETIOLOGY: ICD-10-CM

## 2019-01-17 PROCEDURE — 3008F PR BODY MASS INDEX (BMI) DOCUMENTED: ICD-10-PCS | Mod: CPTII,S$GLB,, | Performed by: NURSE PRACTITIONER

## 2019-01-17 PROCEDURE — 99999 PR PBB SHADOW E&M-EST. PATIENT-LVL IV: ICD-10-PCS | Mod: PBBFAC,,, | Performed by: NURSE PRACTITIONER

## 2019-01-17 PROCEDURE — 99213 PR OFFICE/OUTPT VISIT, EST, LEVL III, 20-29 MIN: ICD-10-PCS | Mod: 25,S$GLB,, | Performed by: NURSE PRACTITIONER

## 2019-01-17 PROCEDURE — 99213 OFFICE O/P EST LOW 20 MIN: CPT | Mod: 25,S$GLB,, | Performed by: NURSE PRACTITIONER

## 2019-01-17 PROCEDURE — 3008F BODY MASS INDEX DOCD: CPT | Mod: CPTII,S$GLB,, | Performed by: NURSE PRACTITIONER

## 2019-01-17 PROCEDURE — 99999 PR PBB SHADOW E&M-EST. PATIENT-LVL IV: CPT | Mod: PBBFAC,,, | Performed by: NURSE PRACTITIONER

## 2019-01-17 PROCEDURE — 96372 PR INJECTION,THERAP/PROPH/DIAG2ST, IM OR SUBCUT: ICD-10-PCS | Mod: S$GLB,,, | Performed by: NURSE PRACTITIONER

## 2019-01-17 PROCEDURE — 96372 THER/PROPH/DIAG INJ SC/IM: CPT | Mod: S$GLB,,, | Performed by: NURSE PRACTITIONER

## 2019-01-17 RX ORDER — BETAMETHASONE SODIUM PHOSPHATE AND BETAMETHASONE ACETATE 3; 3 MG/ML; MG/ML
12 INJECTION, SUSPENSION INTRA-ARTICULAR; INTRALESIONAL; INTRAMUSCULAR; SOFT TISSUE ONCE
Status: COMPLETED | OUTPATIENT
Start: 2019-01-17 | End: 2019-01-17

## 2019-01-17 RX ORDER — AZITHROMYCIN 250 MG/1
TABLET, FILM COATED ORAL
Qty: 6 TABLET | Refills: 0 | Status: SHIPPED | OUTPATIENT
Start: 2019-01-17 | End: 2019-01-22

## 2019-01-17 RX ADMIN — BETAMETHASONE SODIUM PHOSPHATE AND BETAMETHASONE ACETATE 12 MG: 3; 3 INJECTION, SUSPENSION INTRA-ARTICULAR; INTRALESIONAL; INTRAMUSCULAR; SOFT TISSUE at 01:01

## 2019-01-17 NOTE — PROGRESS NOTES
Subjective:       Patient ID: Aron Thao is a 33 y.o. male.    Chief Complaint: Follow-up    Cough   This is a new (patient was seen in clinic on 1/2/19 with similar complaints.  He was diagnosed with acute sinusitis and cough.  He was given augmentin, proair and cough RX.  He reports symptoms resolved and returned again 6 days ago.  Has been using OTC medication ) problem. The current episode started 1 to 4 weeks ago. The problem has been waxing and waning. The problem occurs every few minutes. The cough is non-productive. Associated symptoms include headaches (sinus headache), nasal congestion, postnasal drip, rhinorrhea and a sore throat. Pertinent negatives include no chest pain, chills, ear congestion, ear pain, fever, heartburn, hemoptysis, myalgias, rash, shortness of breath, sweats, weight loss or wheezing. Nothing aggravates the symptoms. Treatments tried: OTC nasocort  The treatment provided mild relief. His past medical history is significant for asthma and environmental allergies. There is no history of bronchiectasis, bronchitis, COPD, emphysema or pneumonia.     Review of Systems   Constitutional: Negative for appetite change, chills, fatigue, fever and weight loss.   HENT: Positive for congestion, postnasal drip, rhinorrhea, sinus pressure and sore throat. Negative for ear pain and sinus pain.    Eyes: Negative.    Respiratory: Positive for cough. Negative for hemoptysis, shortness of breath and wheezing.    Cardiovascular: Negative for chest pain.   Gastrointestinal: Negative.  Negative for heartburn.   Genitourinary: Negative.    Musculoskeletal: Negative for arthralgias, myalgias and neck pain.   Skin: Negative for color change and rash.   Allergic/Immunologic: Positive for environmental allergies.   Neurological: Positive for headaches (sinus headache). Negative for dizziness, weakness and numbness.   Hematological: Negative.    Psychiatric/Behavioral: Negative.    All other systems  reviewed and are negative.      Objective:      Physical Exam   Constitutional: He is oriented to person, place, and time. He appears well-developed and well-nourished. No distress.   HENT:   Head: Normocephalic and atraumatic.   Right Ear: Tympanic membrane, external ear and ear canal normal.   Left Ear: Tympanic membrane, external ear and ear canal normal.   Nose: Mucosal edema, rhinorrhea and septal deviation (mild to the right ) present. No sinus tenderness.  No foreign bodies. Right sinus exhibits no maxillary sinus tenderness and no frontal sinus tenderness. Left sinus exhibits no maxillary sinus tenderness and no frontal sinus tenderness.   Mouth/Throat: Uvula is midline and mucous membranes are normal. No uvula swelling. Posterior oropharyngeal erythema (with cobblestoning ) present. No posterior oropharyngeal edema.   Eyes: Conjunctivae are normal. Pupils are equal, round, and reactive to light. Right eye exhibits no discharge. Left eye exhibits no discharge. No scleral icterus.   Neck: Normal range of motion. Neck supple.   Cardiovascular: Normal rate, regular rhythm, normal heart sounds and intact distal pulses.   Pulmonary/Chest: Effort normal and breath sounds normal. No respiratory distress. He has no wheezes. He has no rales.   Abdominal: Soft. Bowel sounds are normal. He exhibits no distension.   Musculoskeletal: Normal range of motion. He exhibits no edema.   Lymphadenopathy:     He has no cervical adenopathy.   Neurological: He is alert and oriented to person, place, and time.   Skin: Skin is warm and dry. Capillary refill takes less than 2 seconds. He is not diaphoretic. No pallor.   Psychiatric: He has a normal mood and affect.   Nursing note and vitals reviewed.      Assessment:       1. Rhinosinusitis    2. Environmental and seasonal allergies    3. Acute pharyngitis, unspecified etiology        Plan:       Rhinosinusitis  -     azithromycin (Z-MARY) 250 MG tablet; Take 2 tablets by mouth on day  1; Take 1 tablet by mouth on days 2-5  Dispense: 6 tablet; Refill: 0  Patient has a known history of asthma and allergies.  He was instructed he does not need antibiotics at this time however, he is going to travel for two weeks to see a terminally ill family member.  I will provide an RX for him for Zpack for him to take with him on the trip.  He was instructed to avoid taking it unless he develops fever or symptoms linger more than 10 to 14 days.  He voiced understanding of these instructions. He will be given a steroid injection to aid in further symptom relief.  He will use Sensimist and Claritin D 12 hour as per label instructions.  Increase oral fluids an rest.  Try to avoid known allergy triggers.     Environmental and seasonal allergies    Acute pharyngitis, unspecified etiology    Patient was instructed to increase fluids and rest today.  May use throat lozenge of their choice and OTC fever/pain reducer as per label instructions.  Warm salt water gargles with 1/4 tsp of salt with 8 oz of water 2-3 times per day.   Other orders  -     betamethasone acetate-betamethasone sodium phosphate injection 12 mg  -     loratadine-pseudoephedrine 5-120 mg (CLARITIN-D 12-HOUR) 5-120 mg per tablet; Take 1 tablet by mouth 2 (two) times daily as needed for Allergies.  Dispense: 20 tablet; Refill: 0         Medication List           Accurate as of 1/17/19  3:04 PM. If you have any questions, ask your nurse or doctor.               START taking these medications    azithromycin 250 MG tablet  Commonly known as:  Z-MARY  Take 2 tablets by mouth on day 1; Take 1 tablet by mouth on days 2-5  Started by:  Ignacia Salazar DNP, NP-C     loratadine-pseudoephedrine 5-120 mg 5-120 mg per tablet  Commonly known as:  CLARITIN-D 12-hour  Take 1 tablet by mouth 2 (two) times daily as needed for Allergies.  Started by:  Ignacia Salazar DNP, NP-C        CONTINUE taking these medications    albuterol 90 mcg/actuation inhaler  Commonly  known as:  PROAIR HFA  Inhale 2 puffs into the lungs every 6 (six) hours as needed for Wheezing or Shortness of Breath. Rescue     ibuprofen 200 MG tablet  Commonly known as:  ADVIL,MOTRIN        STOP taking these medications    fluticasone 27.5 mcg/actuation nasal spray  Commonly known as:  VERAMYST  Stopped by:  Ignacia Salazar DNP, NP-C     NASACORT NASL  Stopped by:  Ignacia Salazar DNP, NP-C           Where to Get Your Medications      These medications were sent to [facility]/pharmacy #[id number] - [place] - [address]  [address] [place] [address]    Hours:  24-hours Phone:  [phone]   · azithromycin 250 MG tablet  · loratadine-pseudoephedrine 5-120 mg 5-120 mg per tablet             I have reviewed the patient's past medical/surgical and social histories and updated as appropriate. Medications were reviewed and discussed as appropriate including side effects and risks versus benefit. Plan of care was reviewed and agreed upon with the patient.  An opportunity to ask questions was provided and explanation given. Patient verbalized understanding on all information reviewed and discussed.If symptoms worsen patient may call for ASAP appointment or report to the emergency department for further evaluation.

## 2019-01-17 NOTE — PROGRESS NOTES
Pt ID verified by  and Name. Allergies verified. Celestone 12mg administered IM to [facility] per NP order. No adverse reactions noted. Pt tolerated well.

## 2019-01-17 NOTE — PATIENT INSTRUCTIONS
[facility]   Allergic Rhinitis  Allergic rhinitis is an allergic reaction that affects the nose, and often the eyes. Its often known as nasal allergies. Nasal allergies are often due to things in the environment that are breathed in. Depending what you are sensitive to, nasal allergies may occur only during certain seasons. Or they may occur year round. Common indoor allergens include house dust mites, mold, cockroaches, and pet dander. Outdoor allergens include pollen from trees, grasses, and weeds.   Symptoms include a drippy, stuffy, and itchy nose. They also include sneezing and red and itchy eyes. You may feel tired more often. Severe allergies may also affect your breathing and trigger a condition called asthma.   Tests can be done to see what allergens are affecting you. You may be referred to an allergy specialist for testing and further evaluation.  Home care  Your healthcare provider may prescribe medicines to help relieve allergy symptoms. These may include oral medicines, nasal sprays, or eye drops.  Ask your provider for advice on how to avoid substances that you are allergic to. Below are a few tips for each type of allergen.  Pet dander:  · Do not have pets with fur and feathers.  · If you can't avoid having a pet, keep it out of your bedroom and off upholstered furniture.  Pollen:  · When pollen counts are high, keep windows of your car and home closed. If possible, use an air conditioner instead.  · Wear a filter mask when mowing or doing yard work.  House dust mites:  · Wash bedding every week in warm water and detergent and dry on a hot setting.  · Cover the mattress, box spring, and pillows with allergy covers.   · If possible, sleep in a room with no carpet, curtains, or upholstered furniture.  Cockroaches:  · Store food in sealed containers.  · Remove garbage from the home promptly.  · Fix water leaks  Mold:  · Keep humidity low by using a dehumidifier or air conditioner. Keep the  dehumidifier and air conditioner clean and free of mold.  · Clean moldy areas with bleach and water.  In general:  · Vacuum once or twice a week. If possible, use a vacuum with a high-efficiency particulate air (HEPA) filter.  · Do not smoke. Avoid cigarette smoke. Cigarette smoke is an irritant that can make symptoms worse.  Follow-up care  Follow up as advised by the healthcare provider or our staff. If you were referred to an allergy specialist, make this appointment promptly.  When to seek medical advice  Call your healthcare provider right away if the following occur:  · Coughing or wheezing  · Fever greater than 100.4°F (38°C)  · Hives (raised red bumps)  · Continuing symptoms, new symptoms, or worsening symptoms  Call 911 right away if you have:  · Trouble breathing  · Severe swelling of the face or severe itching of the eyes or mouth  Date Last Reviewed: 3/1/2017  © 8403-6618 [employer]. [address] [place] [address], [place] [address]. All rights reserved. This information is not intended as a substitute for professional medical care. Always follow your healthcare professional's instructions.

## 2019-02-12 DIAGNOSIS — E78.2 MIXED HYPERLIPIDEMIA: Primary | ICD-10-CM

## 2019-02-12 DIAGNOSIS — J30.89 ENVIRONMENTAL AND SEASONAL ALLERGIES: ICD-10-CM

## 2019-02-12 DIAGNOSIS — Z00.00 ANNUAL PHYSICAL EXAM: ICD-10-CM

## 2019-02-12 DIAGNOSIS — E66.3 OVERWEIGHT (BMI 25.0-29.9): ICD-10-CM

## 2019-02-15 LAB
ALBUMIN SERPL-MCNC: 4.4 G/DL (ref 3.6–5.1)
ALBUMIN/GLOB SERPL: 1.6 (CALC) (ref 1–2.5)
ALP SERPL-CCNC: 72 U/L (ref 40–115)
ALT SERPL-CCNC: 37 U/L (ref 9–46)
APPEARANCE UR: CLEAR
AST SERPL-CCNC: 24 U/L (ref 10–40)
BASOPHILS # BLD AUTO: 62 CELLS/UL (ref 0–200)
BASOPHILS NFR BLD AUTO: 0.7 %
BILIRUB SERPL-MCNC: 0.4 MG/DL (ref 0.2–1.2)
BILIRUB UR QL STRIP: NEGATIVE
BUN SERPL-MCNC: 19 MG/DL (ref 7–25)
BUN/CREAT SERPL: NORMAL (CALC) (ref 6–22)
CALCIUM SERPL-MCNC: 9.3 MG/DL (ref 8.6–10.3)
CHLORIDE SERPL-SCNC: 103 MMOL/L (ref 98–110)
CHOLEST SERPL-MCNC: 232 MG/DL
CHOLEST/HDLC SERPL: 4.1 (CALC)
CO2 SERPL-SCNC: 25 MMOL/L (ref 20–32)
COLOR UR: YELLOW
CREAT SERPL-MCNC: 1 MG/DL (ref 0.6–1.35)
EOSINOPHIL # BLD AUTO: 142 CELLS/UL (ref 15–500)
EOSINOPHIL NFR BLD AUTO: 1.6 %
ERYTHROCYTE [DISTWIDTH] IN BLOOD BY AUTOMATED COUNT: 13.1 % (ref 11–15)
GFRSERPLBLD MDRD-ARVRAT: 97 ML/MIN/1.73M2
GLOBULIN SER CALC-MCNC: 2.8 G/DL (CALC) (ref 1.9–3.7)
GLUCOSE SERPL-MCNC: 78 MG/DL (ref 65–99)
GLUCOSE UR QL STRIP: NEGATIVE
HCT VFR BLD AUTO: 42.3 % (ref 38.5–50)
HDLC SERPL-MCNC: 57 MG/DL
HGB BLD-MCNC: 14 G/DL (ref 13.2–17.1)
HGB UR QL STRIP: NEGATIVE
KETONES UR QL STRIP: NEGATIVE
LDLC SERPL CALC-MCNC: 156 MG/DL (CALC)
LEUKOCYTE ESTERASE UR QL STRIP: NEGATIVE
LYMPHOCYTES # BLD AUTO: 2750 CELLS/UL (ref 850–3900)
LYMPHOCYTES NFR BLD AUTO: 30.9 %
MCH RBC QN AUTO: 29.8 PG (ref 27–33)
MCHC RBC AUTO-ENTMCNC: 33.1 G/DL (ref 32–36)
MCV RBC AUTO: 90 FL (ref 80–100)
MONOCYTES # BLD AUTO: 908 CELLS/UL (ref 200–950)
MONOCYTES NFR BLD AUTO: 10.2 %
NEUTROPHILS # BLD AUTO: 5037 CELLS/UL (ref 1500–7800)
NEUTROPHILS NFR BLD AUTO: 56.6 %
NITRITE UR QL STRIP: NEGATIVE
NONHDLC SERPL-MCNC: 175 MG/DL (CALC)
PH UR STRIP: NORMAL [PH] (ref 5–8)
PLATELET # BLD AUTO: 279 THOUSAND/UL (ref 140–400)
PMV BLD REES-ECKER: 10.6 FL (ref 7.5–12.5)
POTASSIUM SERPL-SCNC: 4.1 MMOL/L (ref 3.5–5.3)
PROT SERPL-MCNC: 7.2 G/DL (ref 6.1–8.1)
PROT UR QL STRIP: NEGATIVE
RBC # BLD AUTO: 4.7 MILLION/UL (ref 4.2–5.8)
SODIUM SERPL-SCNC: 141 MMOL/L (ref 135–146)
SP GR UR STRIP: 1.02 (ref 1–1.03)
TRIGL SERPL-MCNC: 88 MG/DL
WBC # BLD AUTO: 8.9 THOUSAND/UL (ref 3.8–10.8)

## 2019-02-19 ENCOUNTER — OFFICE VISIT (OUTPATIENT)
Dept: PRIMARY CARE CLINIC | Facility: CLINIC | Age: 34
End: 2019-02-19
Payer: COMMERCIAL

## 2019-02-19 VITALS
OXYGEN SATURATION: 96 % | HEIGHT: 73 IN | TEMPERATURE: 99 F | BODY MASS INDEX: 28.81 KG/M2 | WEIGHT: 217.38 LBS | SYSTOLIC BLOOD PRESSURE: 125 MMHG | RESPIRATION RATE: 16 BRPM | DIASTOLIC BLOOD PRESSURE: 70 MMHG | HEART RATE: 85 BPM

## 2019-02-19 DIAGNOSIS — E66.3 OVERWEIGHT (BMI 25.0-29.9): ICD-10-CM

## 2019-02-19 DIAGNOSIS — E78.00 PURE HYPERCHOLESTEROLEMIA: ICD-10-CM

## 2019-02-19 DIAGNOSIS — Z00.00 ANNUAL PHYSICAL EXAM: Primary | ICD-10-CM

## 2019-02-19 DIAGNOSIS — Z97.3 WEARS GLASSES: ICD-10-CM

## 2019-02-19 PROCEDURE — 99999 PR PBB SHADOW E&M-EST. PATIENT-LVL IV: ICD-10-PCS | Mod: PBBFAC,,, | Performed by: NURSE PRACTITIONER

## 2019-02-19 PROCEDURE — 99999 PR PBB SHADOW E&M-EST. PATIENT-LVL IV: CPT | Mod: PBBFAC,,, | Performed by: NURSE PRACTITIONER

## 2019-02-19 PROCEDURE — 99395 PR PREVENTIVE VISIT,EST,18-39: ICD-10-PCS | Mod: S$GLB,,, | Performed by: NURSE PRACTITIONER

## 2019-02-19 PROCEDURE — 99395 PREV VISIT EST AGE 18-39: CPT | Mod: S$GLB,,, | Performed by: NURSE PRACTITIONER

## 2019-02-19 NOTE — PROGRESS NOTES
After Visit Summary   7/27/2017    Benjamin R Dubinsky    MRN: [id number]           Patient Information     Date Of Birth          2005        Visit Information        Provider Department      7/27/2017 4:00 PM Duke Wilcox MD [facility]        Today's Diagnoses     Encounter for routine child health examination w/o abnormal findings    -  1      Care Instructions        Preventive Care at the 12 - 14 Year Visit    Growth Percentiles & Measurements   Weight: 0 lbs 0 oz / Patient weight not available. / No weight on file for this encounter.  Length: Data Unavailable / 0 cm No height on file for this encounter.   BMI: There is no height or weight on file to calculate BMI. No height and weight on file for this encounter.   Blood Pressure: No blood pressure reading on file for this encounter.    Next Visit    Continue to see your health care provider every one to two years for preventive care.    Nutrition    It s very important to eat breakfast. This will help you make it through the morning.    Sit down with your family for a meal on a regular basis.    Eat healthy meals and snacks, including fruits and vegetables. Avoid salty and sugary snack foods.    Be sure to eat foods that are high in calcium and iron.    Avoid or limit caffeine (often found in soda pop).    Sleeping    Your body needs about 9 hours of sleep each night.    Keep screens (TV, computer, and video) out of the bedroom / sleeping area.  They can lead to poor sleep habits and increased obesity.    Health    Limit TV, computer and video time to one to two hours per day.    Set a goal to be physically fit.  Do some form of exercise every day.  It can be an active sport like skating, running, swimming, team sports, etc.    Try to get 30 to 60 minutes of exercise at least three times a week.    Make healthy choices: don t smoke or drink alcohol; don t use drugs.    In your teen years, you can expect . . .    To  Pre-visit chart review completed.    develop or strengthen hobbies.    To build strong friendships.    To be more responsible for yourself and your actions.    To be more independent.    To use words that best express your thoughts and feelings.    To develop self-confidence and a sense of self.    To see big differences in how you and your friends grow and develop.    To have body odor from perspiration (sweating).  Use underarm deodorant each day.    To have some acne, sometimes or all the time.  (Talk with your doctor or nurse about this.)    Girls will usually begin puberty about two years before boys.  o Girls will develop breasts and pubic hair. They will also start their menstrual periods.  o Boys will develop a larger penis and testicles, as well as pubic hair. Their voices will change, and they ll start to have  wet dreams.     Sexuality    It is normal to have sexual feelings.    Find a supportive person who can answer questions about puberty, sexual development, sex, abstinence (choosing not to have sex), sexually transmitted diseases (STDs) and birth control.    Think about how you can say no to sex.    Safety    Accidents are the greatest threat to your health and life.    Always wear a seat belt in the car.    Practice a fire escape plan at home.  Check smoke detector batteries twice a year.    Keep electric items (like blow dryers, razors, curling irons, etc.) away from water.    Wear a helmet and other protective gear when bike riding, skating, skateboarding, etc.    Use sunscreen to reduce your risk of skin cancer.    Learn first aid and CPR (cardiopulmonary resuscitation).    Avoid dangerous behaviors and situations.  For example, never get in a car if the  has been drinking or using drugs.    Avoid peers who try to pressure you into risky activities.    Learn skills to manage stress, anger and conflict.    Do not use or carry any kind of weapon.    Find a supportive person (teacher, parent, health provider, counselor) whom you  can talk to when you feel sad, angry, lonely or like hurting yourself.    Find help if you are being abused physically or sexually, or if you fear being hurt by others.    As a teenager, you will be given more responsibility for your health and health care decisions.  While your parent or guardian still has an important role, you will likely start spending some time alone with your health care provider as you get older.  Some teen health issues are actually considered confidential, and are protected by law.  Your health care team will discuss this and what it means with you.  Our goal is for you to become comfortable and confident caring for your own health.  ==============================================================          Follow-ups after your visit        Who to contact     If you have questions or need follow up information about today's clinic visit or your schedule please contact [facility] directly at [phone].  Normal or non-critical lab and imaging results will be communicated to you by [employer], letter or phone within 4 business days after the clinic has received the results. If you do not hear from us within 7 days, please contact the clinic through [employer] or phone. If you have a critical or abnormal lab result, we will notify you by phone as soon as possible.  Submit refill requests through [employer] or call your pharmacy and they will forward the refill request to us. Please allow 3 business days for your refill to be completed.          Additional Information About Your Visit        [employer] Information     [employer] lets you send messages to your doctor, view your test results, renew your prescriptions, schedule appointments and more. To sign up, go to [email or web address], contact your [facility] clinic or call [phone] during business hours.            Care EveryWhere ID     This is your Care EveryWhere ID. This could be used by other organizations to access your [facility]  "medical records  Opted out of Care Everywhere exchange        Your Vitals Were     Pulse Temperature Height Pulse Oximetry BMI (Body Mass Index)       83 99.2  F (37.3  C) (Oral) 5' 3.5\" (1.613 m) 98% 24.13 kg/m2        Blood Pressure from Last 3 Encounters:   07/27/17 115/70   06/03/16 100/70   11/27/15 90/62    Weight from Last 3 Encounters:   07/27/17 138 lb 6.4 oz (62.8 kg) (88 %)*   06/03/16 124 lb (56.2 kg) (89 %)*   01/02/16 114 lb 9.6 oz (52 kg) (87 %)*     * Growth percentiles are based on CDC 2-20 Years data.              We Performed the Following     BEHAVIORAL / EMOTIONAL ASSESSMENT [81367]     PURE TONE HEARING TEST, AIR     SCREENING, VISUAL ACUITY, QUANTITATIVE, BILAT        Primary Care Provider Office Phone # Fax #    Diana Judge [phone]       [facility] [address] [place] [address]        Equal Access to Services     [facility] Hospital[facility] : Hadii aad ku hadasho Soomaali, waaxda luqadaha, qaybta kaalmada adeegyagary, lizbeth tineo . So [facility] [phone].    ATENCIÓN: Si habla español, tiene a cano disposición servicios gratuitos de asistencia lingüística. Germán al [phone].    We comply with applicable federal civil rights laws and [place] laws. We do not discriminate on the basis of race, color, national origin, age, disability sex, sexual orientation or gender identity.            Thank you!     Thank you for choosing [facility]  for your care. Our goal is always to provide you with excellent care. Hearing back from our patients is one way we can continue to improve our services. Please take a few minutes to complete the written survey that you may receive in the mail after your visit with us. Thank you!             Your Updated Medication List - Protect others around you: Learn how to safely use, store and throw away your medicines at www.disposemymeds.org.      Notice  As of 7/27/2017 11:59 PM    " You have not been prescribed any medications.

## 2019-02-19 NOTE — PROGRESS NOTES
Subjective:       Patient ID: Aron Thao is a 33 y.o. male.    Chief Complaint: Annual Exam    Patient presents to clinic today for annual physical exam and to review his recent annual labs.  He has no acute complaints.  He is up to date on immunizations, and appropriate health screening test.  He is exercising 2 to 3 times per week playing volley ball and running.  He is adhering to a regular diet but tries to be eat healthy.  He wears sun screen and uses seat belts. His dental and eye exams are up to date ( completed 5 months ago).        Review of Systems   Constitutional: Negative.    HENT: Positive for congestion (chronic intermittent sinus congestion and allergies ).    Eyes: Negative.    Respiratory: Negative.    Cardiovascular: Negative.    Gastrointestinal: Negative.    Endocrine: Negative.    Genitourinary: Negative.    Musculoskeletal: Negative.    Skin: Negative.    Allergic/Immunologic: Negative.    Neurological: Negative.    Hematological: Negative.    Psychiatric/Behavioral: Negative.    All other systems reviewed and are negative.      Objective:      Physical Exam   Constitutional: He is oriented to person, place, and time. He appears well-developed and well-nourished. No distress.   HENT:   Head: Normocephalic and atraumatic.   Right Ear: External ear normal.   Left Ear: External ear normal.   Nose: Nose normal.   Mouth/Throat: Oropharynx is clear and moist. No oropharyngeal exudate.   Eyes: Conjunctivae and EOM are normal. Pupils are equal, round, and reactive to light. Right eye exhibits no discharge. Left eye exhibits no discharge. No scleral icterus.   Neck: Normal range of motion. Neck supple. No thyromegaly present.   Cardiovascular: Normal rate, regular rhythm, normal heart sounds and intact distal pulses. Exam reveals no gallop and no friction rub.   No murmur heard.  Pulmonary/Chest: Effort normal and breath sounds normal. No respiratory distress.   Abdominal: Soft. Bowel sounds are  normal. He exhibits no distension. There is no tenderness.   Musculoskeletal: Normal range of motion. He exhibits no edema, tenderness or deformity.   Lymphadenopathy:     He has no cervical adenopathy.   Neurological: He is alert and oriented to person, place, and time. No cranial nerve deficit. Coordination normal.   Skin: Skin is warm and dry. Capillary refill takes less than 2 seconds. No rash noted. He is not diaphoretic. No erythema.   Psychiatric: He has a normal mood and affect. His behavior is normal. Thought content normal.   Nursing note and vitals reviewed.        Status:  Final result   Visible to patient:  Yes (Patient Portal)   Next appt:  None   Dx:  Mixed hyperlipidemia; Annual physical...    Ref Range & Units 5d ago   Cholesterol <200 mg/dL 232 Abnormally high     HDL >40 mg/dL 57    Triglycerides <150 mg/dL 88    LDL Cholesterol mg/dL (calc) 156 Abnormally high     Comment: Reference range: <100       Desirable range <100 mg/dL for primary prevention;     <70 mg/dL for patients with CHD or diabetic patients   with > or = 2 CHD risk factors.       LDL-C is now calculated using the Todd-Cheryl   calculation, which is a validated novel method providing   better accuracy than the Friedewald equation in the   estimation of LDL-C.   Todd SS et al. AILYN. 2013;310(19): 2134-3385   (http://education.[employer]week[employer]oo.Imperva/faq/ZAQ306)    HDL/Chol Ratio <5.0 (calc) 4.1    Non HDL Chol. (LDL+VLDL) <130 mg/dL (calc) 175 Abnormally high     Comment: For patients with diabetes plus 1 major ASCVD risk   factor, treating to a non-HDL-C goal of <100 mg/dL   (LDL-C of <70 mg/dL) is considered a therapeutic            Lab Results   Component Value Date    WBC 8.9 02/14/2019    HGB 14.0 02/14/2019    HCT 42.3 02/14/2019     02/14/2019    CHOL 232 (H) 02/14/2019    TRIG 88 02/14/2019    HDL 57 02/14/2019    ALT 37 02/14/2019    AST 24 02/14/2019     02/14/2019    K 4.1 02/14/2019     02/14/2019     CREATININE 1.00 02/14/2019    BUN 19 02/14/2019    CO2 25 02/14/2019    TSH 2.381 05/24/2013     Assessment:       1. Annual physical exam    2. Pure hypercholesterolemia    3. Overweight (BMI 25.0-29.9)    4. Wears glasses        Plan:       Annual physical exam  Over all doing well.  Encouraged to increase physical activity and increase efforts to decrease BMI including making diet modifications. BMI goal is 25   Pure hypercholesterolemia  -     Lipid panel; Future; Expected date: 08/19/2019  Not at goal.  Pt. To be given a trail of TLCs.  Previously he was on Pravachol and did well with lipids at goal.  We will recheck labs in 6 months.   Overweight (BMI 25.0-29.9)  Plan as noted.   Wears glasses  Exam up to date.     Medication List with Changes/Refills   Current Medications    ALBUTEROL (PROAIR HFA) 90 MCG/ACTUATION INHALER    Inhale 2 puffs into the lungs every 6 (six) hours as needed for Wheezing or Shortness of Breath. Rescue    IBUPROFEN (ADVIL,MOTRIN) 200 MG TABLET    Take 200 mg by mouth as needed for Pain (headaches).         I have reviewed the patient's past medical/surgical and social histories and updated as appropriate. Medications were reviewed and discussed as appropriate including side effects and risks versus benefit. Plan of care was reviewed and agreed upon with the patient.  An opportunity to ask questions was provided and explanation given. Patient verbalized understanding on all information reviewed and discussed. F/u in 6 months with labs.

## 2019-02-19 NOTE — PATIENT INSTRUCTIONS

## 2019-05-20 PROBLEM — Z00.00 ANNUAL PHYSICAL EXAM: Status: RESOLVED | Noted: 2019-02-12 | Resolved: 2019-05-20

## 2020-08-13 ENCOUNTER — LAB VISIT (OUTPATIENT)
Dept: LAB | Facility: HOSPITAL | Age: 35
End: 2020-08-13
Attending: INTERNAL MEDICINE
Payer: COMMERCIAL

## 2020-08-13 ENCOUNTER — OFFICE VISIT (OUTPATIENT)
Dept: RHEUMATOLOGY | Facility: CLINIC | Age: 35
End: 2020-08-13
Payer: COMMERCIAL

## 2020-08-13 VITALS
BODY MASS INDEX: 28.67 KG/M2 | DIASTOLIC BLOOD PRESSURE: 79 MMHG | SYSTOLIC BLOOD PRESSURE: 115 MMHG | WEIGHT: 217.31 LBS | TEMPERATURE: 98 F

## 2020-08-13 DIAGNOSIS — M79.18 MYOFASCIAL PAIN SYNDROME: ICD-10-CM

## 2020-08-13 DIAGNOSIS — M79.7 FIBROMYALGIA: Primary | ICD-10-CM

## 2020-08-13 DIAGNOSIS — M79.7 FIBROMYALGIA: ICD-10-CM

## 2020-08-13 LAB
CK SERPL-CCNC: 99 U/L (ref 20–200)
CRP SERPL-MCNC: 0.38 MG/DL

## 2020-08-13 PROCEDURE — 99204 PR OFFICE/OUTPT VISIT, NEW, LEVL IV, 45-59 MIN: ICD-10-PCS | Mod: S$GLB,,, | Performed by: INTERNAL MEDICINE

## 2020-08-13 PROCEDURE — 99204 OFFICE O/P NEW MOD 45 MIN: CPT | Mod: S$GLB,,, | Performed by: INTERNAL MEDICINE

## 2020-08-13 PROCEDURE — 3008F PR BODY MASS INDEX (BMI) DOCUMENTED: ICD-10-PCS | Mod: S$GLB,,, | Performed by: INTERNAL MEDICINE

## 2020-08-13 PROCEDURE — 86140 C-REACTIVE PROTEIN: CPT

## 2020-08-13 PROCEDURE — 86235 NUCLEAR ANTIGEN ANTIBODY: CPT

## 2020-08-13 PROCEDURE — 3008F BODY MASS INDEX DOCD: CPT | Mod: S$GLB,,, | Performed by: INTERNAL MEDICINE

## 2020-08-13 PROCEDURE — 82550 ASSAY OF CK (CPK): CPT

## 2020-08-13 PROCEDURE — 86200 CCP ANTIBODY: CPT

## 2020-08-13 PROCEDURE — 81374 HLA I TYPING 1 ANTIGEN LR: CPT

## 2020-08-13 RX ORDER — NORTRIPTYLINE HYDROCHLORIDE 25 MG/1
25 CAPSULE ORAL NIGHTLY
Qty: 30 CAPSULE | Refills: 1 | Status: SHIPPED | OUTPATIENT
Start: 2020-08-13 | End: 2020-09-10

## 2020-08-13 NOTE — PATIENT INSTRUCTIONS
Myofascial Pain Syndrome  Your pain is caused by a state of chronic muscle tension. This condition is called by various names: myofascial pain, fibrositis, and trigger point pain. This can also be due to mechanical stress, such as working at a computer terminal for long periods or work that requires repetitive motions of the arms or hands. It can also be caused by emotional stress, such as problems on the job or in your personal life. Sometimes there is no obvious cause. The pain can happen in the area of the muscle spasm or at a site distant to it. For example, spasm of a neck muscle can cause headache. Spasm of the muscle near the shoulder blade can cause pain shooting down the arm.  Home care  · Try to identify the factors that may be causing your problem and change them:  ¨ If you feel that emotional stress is a cause of your pain, learn methods to better deal with the stress in your life. These may include regular exercise, muscle relaxation techniques, meditation, or simply taking time out for yourself. Talk with your healthcare provider or go to a local bookstore and review the many books and tapes about reducing stress.  ¨ If you feel that physical stress is a cause for your pain, try to change any poor work habits.  · You may use over-the-counter pain medicine to control pain, unless another medicine was prescribed. If you have chronic liver or kidney disease or ever had a stomach ulcer or GI bleeding, talk with your healthcare provider before using these medicines.  · Apply an ice pack over the injured area for 15 to 20 minutes every 3 to 6 hours. You should do this for the first 24 to 48 hours. You can make an ice pack by filling a plastic bag that seals at the top with ice cubes and then wrapping it with a thin towel. Be careful not to injure your skin with the ice treatments. Ice should never be applied directly to skin. Continue the use of ice packs for relief of pain and swelling as needed. After 48  hours, apply heat (warm shower or warm bath) for 15 to 20 minutes several times a day, or alternate ice and heat.  · Massage the trigger point and stretch out the muscle. Trigger point massage can be done by applying heat to the area to warm and prepare the muscle. Then have someone apply steady thumb pressure directly on the knot in the muscle for 30 seconds. Release the pressure, then massage the surrounding muscle. Repeat the process, applying more pressure to the trigger point each time. Do this up to the limit of pain. With each treatment, the trigger point should become less tender and the pain should decrease. You can apply local pressure to trigger points in the back by lying on the floor with a tennis ball under the trigger point.  Follow-up care  Follow up with your healthcare provider, or as advised. It may be necessary for you to receive physical therapy if you dont respond to home treatment alone.  Call 911  Call 911 if you have:  · A trigger point in the chest muscles, pain that becomes more severe, lasts longer, or spreads into your shoulder, arm, or jaw  · Chest pain or discomfort  · Trouble breathing with or without chest discomfort   · Sweating, lightheadedness, nausea, or vomiting along with chest discomfort  · Sudden weakness in the arm, leg, or face, especially if this happens on one side of the body  When to seek medical advice  Call your healthcare provider right away if any of these occur:  · A week passes and you have not improved  · If you develop weakness or numbness in an extremity  · If your pain worsens, regardless of its location  Date Last Reviewed: 11/23/2015  © 0480-6677 [employer]. [address] [place] [address], [place] [address]. All rights reserved. This information is not intended as a substitute for professional medical care. Always follow your healthcare professional's instructions.

## 2020-08-13 NOTE — PROGRESS NOTES
[facility] RHEUMATOLOGY           New patient visit    Notes dictated to M*Modal. Please forgive any unintentional errors.  Subjective:       Patient ID:   NAME: Aron Thao : 1985     35 y.o. male    Referring Doc: No ref. provider found  Other Physicians:    Chief Complaint:  Joint Pain      HPI:  This very pleasant gentleman is self-referred for the evaluation of generalized muscle and joint pain.  Patient states that for at least the last 15 years, he has had prolonged episodes muscle aching and joint stiffness.  He is uncertain about what triggers his pain.  He gets good results with ibuprofen 800 mg once daily, stating that he feels 60-70% better.  He has not had any red, hot, and/or swollen joints.  He does have morning stiffness that involves the lower back.  This is of 20-30 minutes duration.  He notices that his shower his helpful and states that is where I do my stretching.  He has had extensive blood testing performed over the last few years including recently by his nurse practitioner.  That testing showed a negative rheumatoid factor and normal acute phase reactants.    His past medical history is significant for atopy only.  This has manifested itself as recurrent conjunctivitis, skin rashes, and asthma attacks.    ROS:   GEN:      no fever, night sweats or weight loss  SKIN:     no rashes, bruising, no Raynauds, no photosensitivity  HEENT:  no acute changes in vision, no mouth ulcers, no sicca symptoms, no scalp tenderness, jaw claudication.  CV:        no CP, PND, CARLISLE or orthopnea, no palpitations  PULM:   no SOB, cough, hemoptysis, sputum or pleuritic pain  GI:          No dysphagia, GERD, hematemesis; no abdominal pain, nausea, vomiting, constipation, diarrhea, melanotic stools, BRBPR.  :        no hematuria, dysuria  NEURO: no paresthesias, headaches, visual disturbances  MUSCULOSKELETAL:  As above  PSYCH: ++ insomnia, + anxiety, no depression    Past Medical/Surgical  History:  Past Medical History:   Diagnosis Date    Allergy      Past Surgical History:   Procedure Laterality Date    KNEE SURGERY      right    NASAL SEPTUM SURGERY  2008    TONSILLECTOMY         Allergies:  Review of patient's allergies indicates:  No Known Allergies    Social/Family History:  Social History     Socioeconomic History    Marital status:      Spouse name: Not on file    Number of children: Not on file    Years of education: Not on file    Highest education level: Not on file   Occupational History    Not on file   Social Needs    Financial resource strain: Not on file    Food insecurity     Worry: Not on file     Inability: Not on file    Transportation needs     Medical: Not on file     Non-medical: Not on file   Tobacco Use    Smoking status: Never Smoker    Smokeless tobacco: Never Used   Substance and Sexual Activity    Alcohol use: Yes     Comment: social     Drug use: No    Sexual activity: Yes     Partners: Female   Lifestyle    Physical activity     Days per week: Not on file     Minutes per session: Not on file    Stress: Not on file   Relationships    Social connections     Talks on phone: Not on file     Gets together: Not on file     Attends Taoist service: Not on file     Active member of club or organization: Not on file     Attends meetings of clubs or organizations: Not on file     Relationship status: Not on file   Other Topics Concern    Not on file   Social History Narrative    Not on file     Family History   Problem Relation Age of Onset    COPD Mother     Lung cancer Mother     Emphysema Mother     Asthma Brother     COPD Maternal Grandmother     Emphysema Maternal Grandmother     Rose Mary-Danlos syndrome Sister      FAMILY HISTORY: negative for Connective Tissue Disease      Medications:    Current Outpatient Medications:     albuterol (PROAIR HFA) 90 mcg/actuation inhaler, Inhale 2 puffs into the lungs every 6 (six) hours as needed for  Wheezing or Shortness of Breath. Rescue, Disp: 18 g, Rfl: 0    ibuprofen (ADVIL,MOTRIN) 200 MG tablet, Take 200 mg by mouth as needed for Pain (headaches)., Disp: , Rfl:     nortriptyline (PAMELOR) 25 MG capsule, Take 1 capsule (25 mg total) by mouth every evening., Disp: 30 capsule, Rfl: 1    Objective:     Vitals:  Blood pressure 115/79, temperature 98.1 °F (36.7 °C), weight 98.6 kg (217 lb 4.8 oz).    Physical Examination:   GEN:     wn/wd male in no apparent distress  SKIN:    no rashes, no sclerodactyly, no Raynaud's, no periungual erythema, no digital tip tapering, no nailbed pitting  HEAD:   no alopecia, no scalp tenderness, no temporal artery tenderness or induration.  EYES:   no conjunctival pallor, no icterus  ENT:     no thrush, no mucosal dryness or ulcerations, adequate oral hygiene & dentition.  NECK:  supple x 6, no masses, no thyromegaly, no lymphadenopathy.  CV:        S1 and S2, RRR, no murmurs, gallop or rubs  CHEST: Normal respiratory effort;  normal breath sounds/no adventitious sounds. No signs of consolidation.  ABD:      non-tender and non-distended; soft; normal bowel sounds; no rebound, guarding, or tenderness. No hepatosplenomegaly.  Musculoskeletal:  No evidence of inflammatory arthritis involving the small joints of the hands or feet.  No evidence of inflammation, deformity, or instability of large joints.  Strength is 5/5 x4.  Trigger points are reactive in 4 of 6 tested locations.  Range of motion of the back is full.  Posture is normal.  Gait is normal    EXTREM: no clubbing, cyanosis or edema. normal pulses. Dennis's - x2  NEURO:  grossly intact; motor/sensory WNL; no tremors  PSYCH:  normal mood, affect and behavior    Labs:   Lab Results   Component Value Date    WBC 8.9 02/14/2019    HGB 14.0 02/14/2019    HCT 42.3 02/14/2019    MCV 90.0 02/14/2019     02/14/2019   CMP@  Sodium   Date Value Ref Range Status   02/14/2019 141 135 - 146 mmol/L Final     Potassium   Date Value  Ref Range Status   02/14/2019 4.1 3.5 - 5.3 mmol/L Final     Chloride   Date Value Ref Range Status   02/14/2019 103 98 - 110 mmol/L Final     CO2   Date Value Ref Range Status   02/14/2019 25 20 - 32 mmol/L Final     Glucose   Date Value Ref Range Status   02/14/2019 78 65 - 99 mg/dL Final     Comment:                   Fasting reference interval          BUN, Bld   Date Value Ref Range Status   02/14/2019 19 7 - 25 mg/dL Final     Creatinine   Date Value Ref Range Status   02/14/2019 1.00 0.60 - 1.35 mg/dL Final     Calcium   Date Value Ref Range Status   02/14/2019 9.3 8.6 - 10.3 mg/dL Final     Total Protein   Date Value Ref Range Status   02/14/2019 7.2 6.1 - 8.1 g/dL Final     Albumin   Date Value Ref Range Status   02/14/2019 4.4 3.6 - 5.1 g/dL Final     Total Bilirubin   Date Value Ref Range Status   02/14/2019 0.4 0.2 - 1.2 mg/dL Final     Alkaline Phosphatase   Date Value Ref Range Status   02/14/2019 72 40 - 115 U/L Final     AST   Date Value Ref Range Status   02/14/2019 24 10 - 40 U/L Final     ALT   Date Value Ref Range Status   02/14/2019 37 9 - 46 U/L Final     Rheumatoid Factor   Date Value Ref Range Status   06/07/2018 <10.0 0.0 - 15.0 IU/mL Final       Radiology/Diagnostic Studies:    None    Assessment/Discussion/Plan:   35 y.o. male with 15+ year history of musculoskeletal pain without historical, laboratory, or examination findings of any autoimmune rheumatologic disease-    PLAN:  I do not see anything that suggests an inflammatory disease or a spondyloarthropathy.  I have ordered blood testing that has not been previously performed including an HLA B27.  I discussed my sense with the patient that he has some anxiety issues; he was not surprised or reluctant to discuss it.  His parents  when he was less than a year old.  He was raised by his grandparents.  His parents had legal and drug issues throughout their lives.  He was very forthcoming about his inability to turn off the brain  at night and how that has been a source of insomnia throughout his life. He typically gets in bed at 10:30 a.m., cannot sleep until midnight and wakes the next morning exhausted.    I have explained myofascial pain syndrome/fibromyalgia to him as basically a sleep disorder that causes muscular exhaustion.    I discussed the various medications that can be used and we settled upon Pamelor 25 mg nightly.  I explained that I generally use 2 medications, one as a mood stabilizer and another for sleep but as he is averse to taking medication in general, I selected nortriptyline to accomplish both goals.    He was provided with literature on myofascial pain syndrome.    RTC:  I will see him back in 1 month.            Electronically signed by Johnny Coulter MD                Answers for HPI/ROS submitted by the patient on 8/12/2020   fever: No  eye redness: Yes  headaches: Yes  shortness of breath: No  chest pain: No  trouble swallowing: No  diarrhea: No  constipation: No  unexpected weight change: No  genital sore: No  During the last 3 days, have you had a skin rash?: No  Bruises or bleeds easily: Yes  cough: No

## 2020-08-17 LAB — ENA SS-A AB SER-ACNC: <0.2 AI (ref 0–0.9)

## 2020-08-18 LAB — CCP IGA+IGG SERPL IA-ACNC: 3 UNITS (ref 0–19)

## 2020-08-19 LAB — HLA-B27 QL NAA+PROBE: NEGATIVE

## 2020-09-10 ENCOUNTER — OFFICE VISIT (OUTPATIENT)
Dept: RHEUMATOLOGY | Facility: CLINIC | Age: 35
End: 2020-09-10
Payer: COMMERCIAL

## 2020-09-10 VITALS
BODY MASS INDEX: 28.76 KG/M2 | TEMPERATURE: 98 F | WEIGHT: 218 LBS | DIASTOLIC BLOOD PRESSURE: 84 MMHG | SYSTOLIC BLOOD PRESSURE: 135 MMHG

## 2020-09-10 DIAGNOSIS — M79.7 FIBROMYALGIA: Primary | ICD-10-CM

## 2020-09-10 PROCEDURE — 3008F PR BODY MASS INDEX (BMI) DOCUMENTED: ICD-10-PCS | Mod: S$GLB,,, | Performed by: INTERNAL MEDICINE

## 2020-09-10 PROCEDURE — 99213 OFFICE O/P EST LOW 20 MIN: CPT | Mod: S$GLB,,, | Performed by: INTERNAL MEDICINE

## 2020-09-10 PROCEDURE — 3008F BODY MASS INDEX DOCD: CPT | Mod: S$GLB,,, | Performed by: INTERNAL MEDICINE

## 2020-09-10 PROCEDURE — 99213 PR OFFICE/OUTPT VISIT, EST, LEVL III, 20-29 MIN: ICD-10-PCS | Mod: S$GLB,,, | Performed by: INTERNAL MEDICINE

## 2020-09-10 RX ORDER — NORTRIPTYLINE HYDROCHLORIDE 50 MG/1
50 CAPSULE ORAL NIGHTLY
Qty: 90 CAPSULE | Refills: 1 | Status: SHIPPED | OUTPATIENT
Start: 2020-09-10 | End: 2020-10-20

## 2020-09-10 NOTE — PROGRESS NOTES
[facility] RHEUMATOLOGY           Follow-up visit    Notes dictated to M*Modal. Please forgive any unintended errors.  Subjective:       Patient ID:   NAME: Aron Thao : 1985     35 y.o. male    Referring Doc: No ref. provider found  Other Physicians:    Chief Complaint:  Fibromyalgia      HPI/Interval History:  The patient is doing better.  He rates his improvement at 50%.  He has had no issues taking Pamelor 25 mg nightly    ROS:   GEN:    no fever, night sweats or weight loss  SKIN:   no rashes, bruising, or swelling, no Raynauds, no photosensitivity  HEENT: no changes in vision, no mouth ulcers, no sicca symptoms, no scalp tenderness, no jaw claudication.  CV:      no CP, PND, CARLISLE, orthopnea, no palpitations  PULM: no SOB, cough, hemoptysis, sputum or pleuritic pain  GI:        no dysphagia, no GERD, no hematemesis, no abdominal pain, nausea, vomiting, constipation, diarrhea, melanotic stools, BRBPR  :      no hematuria, dysuria  NEURO: no paresthesias, headaches, acute visual disturbances  MUSCULOSKELETAL:  No red, hot, and/or swollen joints  PSYCH:   No increased insomnia, no increased anxiety, no increased depression    Past Medical/Surgical History:  Past Medical History:   Diagnosis Date    Allergy      Past Surgical History:   Procedure Laterality Date    KNEE SURGERY      right    NASAL SEPTUM SURGERY  2008    TONSILLECTOMY         Allergies:  Review of patient's allergies indicates:  No Known Allergies    Social/Family History:  Social History     Socioeconomic History    Marital status:      Spouse name: Not on file    Number of children: Not on file    Years of education: Not on file    Highest education level: Not on file   Occupational History    Not on file   Social Needs    Financial resource strain: Not on file    Food insecurity     Worry: Not on file     Inability: Not on file    Transportation needs     Medical: Not on file     Non-medical: Not on file    Tobacco Use    Smoking status: Never Smoker    Smokeless tobacco: Never Used   Substance and Sexual Activity    Alcohol use: Yes     Comment: social     Drug use: No    Sexual activity: Yes     Partners: Female   Lifestyle    Physical activity     Days per week: Not on file     Minutes per session: Not on file    Stress: Not on file   Relationships    Social connections     Talks on phone: Not on file     Gets together: Not on file     Attends Uatsdin service: Not on file     Active member of club or organization: Not on file     Attends meetings of clubs or organizations: Not on file     Relationship status: Not on file   Other Topics Concern    Not on file   Social History Narrative    Not on file     Family History   Problem Relation Age of Onset    COPD Mother     Lung cancer Mother     Emphysema Mother     Asthma Brother     COPD Maternal Grandmother     Emphysema Maternal Grandmother     Rose Mary-Danlos syndrome Sister      FAMILY HISTORY: negative for Connective Tissue Disease      Medications:    Current Outpatient Medications:     albuterol (PROAIR HFA) 90 mcg/actuation inhaler, Inhale 2 puffs into the lungs every 6 (six) hours as needed for Wheezing or Shortness of Breath. Rescue, Disp: 18 g, Rfl: 0    ibuprofen (ADVIL,MOTRIN) 200 MG tablet, Take 200 mg by mouth as needed for Pain (headaches)., Disp: , Rfl:     nortriptyline (PAMELOR) 50 MG capsule, Take 1 capsule (50 mg total) by mouth every evening., Disp: 90 capsule, Rfl: 1      Objective:     Vitals:  Blood pressure 135/84, temperature 98 °F (36.7 °C), weight 98.9 kg (218 lb).    Physical Examination:   GEN: wn/wd male in no apparent distress  SKIN: no rashes, no sclerodactyly, no Raynaud's, no periungual erythema, no digital tip ulcerations, no nailbed pitting  HEAD: no alopecia, no scalp tenderness, no temporal artery tenderness or induration.  EYES: no pallor, no icterus, PERRLA  ENT:  no thrush, no mucosal dryness or  ulcerations, adequate oral hygiene & dentition.  NECK: supple x 6, no masses, no thyromegaly, no lymphadenopathy.  CV:   S1 and S2 regular, no murmurs, gallop or rubs  CHEST: Normal respiratory effort;  normal breath sounds/no adventitious sounds. No signs of consolidation.  ABD: non-tender and non-distended; soft; normal bowel sounds; no rebound/guarding or tenderness. No hepatosplenomegaly.  Musculoskeletal:  No evidence of active inflammatory arthritis  EXTREM: no clubbing, cyanosis or edema. normal pulses.  NEURO:  grossly intact; motor/sensory WNL; no tremors  PSYCH:  normal mood, affect and behavior    Labs:   Lab Results   Component Value Date    WBC 8.9 02/14/2019    HGB 14.0 02/14/2019    HCT 42.3 02/14/2019    MCV 90.0 02/14/2019     02/14/2019   CMP@  Sodium   Date Value Ref Range Status   02/14/2019 141 135 - 146 mmol/L Final     Potassium   Date Value Ref Range Status   02/14/2019 4.1 3.5 - 5.3 mmol/L Final     Chloride   Date Value Ref Range Status   02/14/2019 103 98 - 110 mmol/L Final     CO2   Date Value Ref Range Status   02/14/2019 25 20 - 32 mmol/L Final     Glucose   Date Value Ref Range Status   02/14/2019 78 65 - 99 mg/dL Final     Comment:                   Fasting reference interval          BUN, Bld   Date Value Ref Range Status   02/14/2019 19 7 - 25 mg/dL Final     Creatinine   Date Value Ref Range Status   02/14/2019 1.00 0.60 - 1.35 mg/dL Final     Calcium   Date Value Ref Range Status   02/14/2019 9.3 8.6 - 10.3 mg/dL Final     Total Protein   Date Value Ref Range Status   02/14/2019 7.2 6.1 - 8.1 g/dL Final     Albumin   Date Value Ref Range Status   02/14/2019 4.4 3.6 - 5.1 g/dL Final     Total Bilirubin   Date Value Ref Range Status   02/14/2019 0.4 0.2 - 1.2 mg/dL Final     Alkaline Phosphatase   Date Value Ref Range Status   02/14/2019 72 40 - 115 U/L Final     AST   Date Value Ref Range Status   02/14/2019 24 10 - 40 U/L Final     ALT   Date Value Ref Range Status    02/14/2019 37 9 - 46 U/L Final     CPK   Date Value Ref Range Status   08/13/2020 99 20 - 200 U/L Final     CRP   Date Value Ref Range Status   08/13/2020 0.38 <0.76 mg/dL Final     Rheumatoid Factor   Date Value Ref Range Status   06/07/2018 <10.0 0.0 - 15.0 IU/mL Final       Radiology/Diagnostic Studies:    None    Assessment/Discussion/Plan:   35 y.o. male with fibromyalgia-good early response to nortriptyline    PLAN:  I have doubled his dose to 50 mg every evening.  Have asked him to let me know if he has difficulty tolerating it at that dose.  I suspect that will work very well.  Blood tests from the last visit were discussed.  They are all within normal limits.    RTC:  I will see him back in 3 months    Electronically signed by Johnny Coulter MD                    Answers for HPI/ROS submitted by the patient on 9/8/2020   fever: No  eye redness: No  mouth sores: Yes  headaches: Yes  shortness of breath: No  chest pain: No  trouble swallowing: No  diarrhea: No  constipation: Yes  unexpected weight change: No  genital sore: No  During the last 3 days, have you had a skin rash?: No  Bruises or bleeds easily: No  cough: No

## 2020-10-20 ENCOUNTER — OFFICE VISIT (OUTPATIENT)
Dept: RHEUMATOLOGY | Facility: CLINIC | Age: 35
End: 2020-10-20
Payer: COMMERCIAL

## 2020-10-20 VITALS
DIASTOLIC BLOOD PRESSURE: 79 MMHG | TEMPERATURE: 98 F | BODY MASS INDEX: 28.96 KG/M2 | SYSTOLIC BLOOD PRESSURE: 118 MMHG | WEIGHT: 219.5 LBS

## 2020-10-20 DIAGNOSIS — M79.7 FIBROMYALGIA: Primary | ICD-10-CM

## 2020-10-20 PROCEDURE — 3008F PR BODY MASS INDEX (BMI) DOCUMENTED: ICD-10-PCS | Mod: S$GLB,,, | Performed by: INTERNAL MEDICINE

## 2020-10-20 PROCEDURE — 99213 OFFICE O/P EST LOW 20 MIN: CPT | Mod: S$GLB,,, | Performed by: INTERNAL MEDICINE

## 2020-10-20 PROCEDURE — 3008F BODY MASS INDEX DOCD: CPT | Mod: S$GLB,,, | Performed by: INTERNAL MEDICINE

## 2020-10-20 PROCEDURE — 99213 PR OFFICE/OUTPT VISIT, EST, LEVL III, 20-29 MIN: ICD-10-PCS | Mod: S$GLB,,, | Performed by: INTERNAL MEDICINE

## 2020-10-20 RX ORDER — AMITRIPTYLINE HYDROCHLORIDE 10 MG/1
10 TABLET, FILM COATED ORAL NIGHTLY
Qty: 90 TABLET | Refills: 1 | Status: SHIPPED | OUTPATIENT
Start: 2020-10-20 | End: 2020-11-19 | Stop reason: DRUGHIGH

## 2020-10-20 NOTE — PROGRESS NOTES
[facility] RHEUMATOLOGY           Follow-up visit    Notes dictated to M*Modal. Please forgive any unintended errors.  Subjective:       Patient ID:   NAME: Aron Thao : 1985     35 y.o. male    Referring Doc: No ref. provider found  Other Physicians:    Chief Complaint:  Fibromyalgia      HPI/Interval History:  The patient discontinued nortriptyline several days ago.  It was causing him severe constipation.  His bowels have returned to normal now that he has been off of it for 4 days.  However, his pain level has increased as well.  He is anxious to discuss an alternative to Pamelor.    ROS:   GEN:    no fever, night sweats or weight loss  SKIN:   no rashes, bruising, or swelling, no Raynauds, no photosensitivity  HEENT: no changes in vision, no mouth ulcers, no sicca symptoms, no scalp tenderness, no jaw claudication.  CV:      no CP, PND, CARLISLE, orthopnea, no palpitations  PULM: no SOB, cough, hemoptysis, sputum or pleuritic pain  GI:        no dysphagia, no GERD, no hematemesis, no abdominal pain, nausea, vomiting, + constipation, diarrhea, melanotic stools, BRBPR  :      no hematuria, dysuria  NEURO: no paresthesias, headaches, acute visual disturbances  MUSCULOSKELETAL:  No red, hot, and/or swollen joints  PSYCH:   + increased insomnia, no increased anxiety, no increased depression    Past Medical/Surgical History:  Past Medical History:   Diagnosis Date    Allergy      Past Surgical History:   Procedure Laterality Date    KNEE SURGERY      right    NASAL SEPTUM SURGERY  2008    TONSILLECTOMY         Allergies:  Review of patient's allergies indicates:  No Known Allergies    Social/Family History:  Social History     Socioeconomic History    Marital status:      Spouse name: Not on file    Number of children: Not on file    Years of education: Not on file    Highest education level: Not on file   Occupational History    Not on file   Social Needs    Financial resource strain:  Not on file    Food insecurity     Worry: Not on file     Inability: Not on file    Transportation needs     Medical: Not on file     Non-medical: Not on file   Tobacco Use    Smoking status: Never Smoker    Smokeless tobacco: Never Used   Substance and Sexual Activity    Alcohol use: Yes     Comment: social     Drug use: No    Sexual activity: Yes     Partners: Female   Lifestyle    Physical activity     Days per week: Not on file     Minutes per session: Not on file    Stress: Not on file   Relationships    Social connections     Talks on phone: Not on file     Gets together: Not on file     Attends Muslim service: Not on file     Active member of club or organization: Not on file     Attends meetings of clubs or organizations: Not on file     Relationship status: Not on file   Other Topics Concern    Not on file   Social History Narrative    Not on file     Family History   Problem Relation Age of Onset    COPD Mother     Lung cancer Mother     Emphysema Mother     Asthma Brother     COPD Maternal Grandmother     Emphysema Maternal Grandmother     Rose Mary-Danlos syndrome Sister      FAMILY HISTORY: negative for Connective Tissue Disease      Medications:    Current Outpatient Medications:     albuterol (PROAIR HFA) 90 mcg/actuation inhaler, Inhale 2 puffs into the lungs every 6 (six) hours as needed for Wheezing or Shortness of Breath. Rescue, Disp: 18 g, Rfl: 0    ibuprofen (ADVIL,MOTRIN) 200 MG tablet, Take 200 mg by mouth as needed for Pain (headaches)., Disp: , Rfl:     amitriptyline (ELAVIL) 10 MG tablet, Take 1 tablet (10 mg total) by mouth every evening., Disp: 90 tablet, Rfl: 1      Objective:     Vitals:  Blood pressure 118/79, temperature 97.8 °F (36.6 °C), weight 99.6 kg (219 lb 8 oz).    Physical Examination:   GEN: wn/wd male in no apparent distress  SKIN: no rashes, no sclerodactyly, no Raynaud's, no periungual erythema, no digital tip ulcerations, no nailbed pitting  HEAD:  no alopecia, no scalp tenderness, no temporal artery tenderness or induration.  EYES: no pallor, no icterus, PERRLA  ENT:  no thrush, no mucosal dryness or ulcerations, adequate oral hygiene & dentition.  NECK: supple x 6, no masses, no thyromegaly, no lymphadenopathy.  CV:   S1 and S2 regular, no murmurs, gallop or rubs  CHEST: Normal respiratory effort;  normal breath sounds/no adventitious sounds. No signs of consolidation.  ABD: non-tender and non-distended; soft; normal bowel sounds; no rebound/guarding or tenderness. No hepatosplenomegaly.  Musculoskeletal:  No evidence of inflammatory arthritis  EXTREM: no clubbing, cyanosis or edema. normal pulses.  NEURO:  grossly intact; motor/sensory WNL; no tremors  PSYCH:  normal mood, affect and behavior    Labs:   Lab Results   Component Value Date    WBC 8.9 02/14/2019    HGB 14.0 02/14/2019    HCT 42.3 02/14/2019    MCV 90.0 02/14/2019     02/14/2019   CMP@  Sodium   Date Value Ref Range Status   02/14/2019 141 135 - 146 mmol/L Final     Potassium   Date Value Ref Range Status   02/14/2019 4.1 3.5 - 5.3 mmol/L Final     Chloride   Date Value Ref Range Status   02/14/2019 103 98 - 110 mmol/L Final     CO2   Date Value Ref Range Status   02/14/2019 25 20 - 32 mmol/L Final     Glucose   Date Value Ref Range Status   02/14/2019 78 65 - 99 mg/dL Final     Comment:                   Fasting reference interval          BUN, Bld   Date Value Ref Range Status   02/14/2019 19 7 - 25 mg/dL Final     Creatinine   Date Value Ref Range Status   02/14/2019 1.00 0.60 - 1.35 mg/dL Final     Calcium   Date Value Ref Range Status   02/14/2019 9.3 8.6 - 10.3 mg/dL Final     Total Protein   Date Value Ref Range Status   02/14/2019 7.2 6.1 - 8.1 g/dL Final     Albumin   Date Value Ref Range Status   02/14/2019 4.4 3.6 - 5.1 g/dL Final     Total Bilirubin   Date Value Ref Range Status   02/14/2019 0.4 0.2 - 1.2 mg/dL Final     Alkaline Phosphatase   Date Value Ref Range Status    02/14/2019 72 40 - 115 U/L Final     AST   Date Value Ref Range Status   02/14/2019 24 10 - 40 U/L Final     ALT   Date Value Ref Range Status   02/14/2019 37 9 - 46 U/L Final     CPK   Date Value Ref Range Status   08/13/2020 99 20 - 200 U/L Final     CRP   Date Value Ref Range Status   08/13/2020 0.38 <0.76 mg/dL Final     Rheumatoid Factor   Date Value Ref Range Status   06/07/2018 <10.0 0.0 - 15.0 IU/mL Final       Radiology/Diagnostic Studies:    None    Assessment/Discussion/Plan:   35 y.o. male with myofascial pain syndrome-exacerbated off Pamelor (due to constipation)    PLAN:  I have discussed the options for treatment going forward.  I am going to try 1 more TCA before changing to an SSRI.  I have given him a prescription for amitriptyline 10 mg nightly.  He will call me in 1-2 weeks with a progress report.  If it is successful, I will continue it.  If it is helping but not fully successful, I will increase the dose.  If the constipation returns, I will change the medication to Lexapro or Prozac.    RTC:  I will see him back in 3 months    Electronically signed by Johnny Coulter MD                    Answers for HPI/ROS submitted by the patient on 10/16/2020   fever: No  eye redness: No  mouth sores: No  headaches: Yes  shortness of breath: No  chest pain: No  trouble swallowing: Yes  diarrhea: No  constipation: Yes  unexpected weight change: No  genital sore: No  During the last 3 days, have you had a skin rash?: No  Bruises or bleeds easily: No  cough: No

## 2020-11-19 ENCOUNTER — TELEPHONE (OUTPATIENT)
Dept: RHEUMATOLOGY | Facility: CLINIC | Age: 35
End: 2020-11-19

## 2020-11-19 DIAGNOSIS — M79.7 FIBROMYALGIA: Primary | ICD-10-CM

## 2020-11-19 RX ORDER — AMITRIPTYLINE HYDROCHLORIDE 25 MG/1
25 TABLET, FILM COATED ORAL NIGHTLY
Qty: 90 TABLET | Refills: 1 | Status: SHIPPED | OUTPATIENT
Start: 2020-11-19 | End: 2020-12-23 | Stop reason: ALTCHOICE

## 2020-11-19 RX ORDER — AMITRIPTYLINE HYDROCHLORIDE 25 MG/1
25 TABLET, FILM COATED ORAL NIGHTLY
COMMUNITY
End: 2020-11-19 | Stop reason: SDUPTHER

## 2020-11-19 NOTE — TELEPHONE ENCOUNTER
Patient notified of elavil dosing instructions. Rx ready to be sent to his pharmacy. Pt verbalized understanding of medication dosing instructions.

## 2020-11-19 NOTE — TELEPHONE ENCOUNTER
Elavil 10 mg is not working. No constipation just hurting again.    Dose increase?    Please advise

## 2020-11-22 ENCOUNTER — PATIENT MESSAGE (OUTPATIENT)
Dept: RHEUMATOLOGY | Facility: CLINIC | Age: 35
End: 2020-11-22

## 2020-11-30 NOTE — TELEPHONE ENCOUNTER
Send an order to this gentleman's lab for a serum uric acid level.  Diagnosis:  Monoarthritis.  Let him know that the order has been sent and that it does not require that he be fasting.  Thanks

## 2020-11-30 NOTE — TELEPHONE ENCOUNTER
Absolutely.  Enter the order and send it to the lab he [email or web address]  Make sure it does not  before he gets it done in 2021.  Thanks

## 2020-12-01 DIAGNOSIS — Z79.899 ENCOUNTER FOR LONG-TERM (CURRENT) USE OF MEDICATIONS: ICD-10-CM

## 2020-12-01 DIAGNOSIS — M13.10 MONOARTHRITIS: Primary | ICD-10-CM

## 2020-12-22 ENCOUNTER — PATIENT MESSAGE (OUTPATIENT)
Dept: RHEUMATOLOGY | Facility: CLINIC | Age: 35
End: 2020-12-22

## 2020-12-23 DIAGNOSIS — M79.7 FIBROMYALGIA: Primary | ICD-10-CM

## 2020-12-23 DIAGNOSIS — M79.18 MYOFASCIAL PAIN SYNDROME: ICD-10-CM

## 2020-12-23 RX ORDER — ESCITALOPRAM OXALATE 10 MG/1
10 TABLET ORAL EVERY MORNING
Qty: 30 TABLET | Refills: 3 | Status: SHIPPED | OUTPATIENT
Start: 2020-12-23 | End: 2021-02-24 | Stop reason: ALTCHOICE

## 2021-02-24 ENCOUNTER — OFFICE VISIT (OUTPATIENT)
Dept: RHEUMATOLOGY | Facility: CLINIC | Age: 36
End: 2021-02-24
Payer: COMMERCIAL

## 2021-02-24 VITALS
SYSTOLIC BLOOD PRESSURE: 121 MMHG | WEIGHT: 227.88 LBS | BODY MASS INDEX: 30.07 KG/M2 | TEMPERATURE: 98 F | DIASTOLIC BLOOD PRESSURE: 81 MMHG

## 2021-02-24 DIAGNOSIS — M79.7 FIBROMYALGIA: Primary | ICD-10-CM

## 2021-02-24 PROCEDURE — 1125F PR PAIN SEVERITY QUANTIFIED, PAIN PRESENT: ICD-10-PCS | Mod: S$GLB,,, | Performed by: INTERNAL MEDICINE

## 2021-02-24 PROCEDURE — 99214 OFFICE O/P EST MOD 30 MIN: CPT | Mod: S$GLB,,, | Performed by: INTERNAL MEDICINE

## 2021-02-24 PROCEDURE — 1125F AMNT PAIN NOTED PAIN PRSNT: CPT | Mod: S$GLB,,, | Performed by: INTERNAL MEDICINE

## 2021-02-24 PROCEDURE — 99214 PR OFFICE/OUTPT VISIT, EST, LEVL IV, 30-39 MIN: ICD-10-PCS | Mod: S$GLB,,, | Performed by: INTERNAL MEDICINE

## 2021-02-24 PROCEDURE — 3008F PR BODY MASS INDEX (BMI) DOCUMENTED: ICD-10-PCS | Mod: S$GLB,,, | Performed by: INTERNAL MEDICINE

## 2021-02-24 PROCEDURE — 3008F BODY MASS INDEX DOCD: CPT | Mod: S$GLB,,, | Performed by: INTERNAL MEDICINE

## 2021-02-24 RX ORDER — DULOXETIN HYDROCHLORIDE 30 MG/1
30 CAPSULE, DELAYED RELEASE ORAL DAILY
Qty: 30 CAPSULE | Refills: 3 | Status: SHIPPED | OUTPATIENT
Start: 2021-02-24 | End: 2022-02-24

## 2021-03-23 DIAGNOSIS — Z23 NEED FOR VACCINATION: Primary | ICD-10-CM

## 2021-03-23 PROCEDURE — 91300 COVID-19, MRNA, LNP-S, PF, 30 MCG/0.3 ML DOSE VACCINE: CPT | Mod: PBBFAC | Performed by: NURSE PRACTITIONER

## 2021-04-07 ENCOUNTER — TELEPHONE (OUTPATIENT)
Dept: INFECTIOUS DISEASES | Facility: CLINIC | Age: 36
End: 2021-04-07

## 2021-04-07 ENCOUNTER — OFFICE VISIT (OUTPATIENT)
Dept: INFECTIOUS DISEASES | Facility: CLINIC | Age: 36
End: 2021-04-07
Payer: COMMERCIAL

## 2021-04-07 VITALS
WEIGHT: 231.5 LBS | HEIGHT: 73 IN | HEART RATE: 86 BPM | TEMPERATURE: 99 F | SYSTOLIC BLOOD PRESSURE: 127 MMHG | BODY MASS INDEX: 30.68 KG/M2 | DIASTOLIC BLOOD PRESSURE: 86 MMHG

## 2021-04-07 DIAGNOSIS — Z11.52 ENCOUNTER FOR SCREENING FOR COVID-19: ICD-10-CM

## 2021-04-07 DIAGNOSIS — Z71.84 TRAVEL ADVICE ENCOUNTER: Primary | ICD-10-CM

## 2021-04-07 PROCEDURE — 99401 PREV MED CNSL INDIV APPRX 15: CPT | Mod: CS,,, | Performed by: PHYSICIAN ASSISTANT

## 2021-04-07 PROCEDURE — 99401 PR PREVENT COUNSEL,INDIV,15 MIN: ICD-10-PCS | Mod: CS,,, | Performed by: PHYSICIAN ASSISTANT

## 2021-04-07 PROCEDURE — 99999 PR PBB SHADOW E&M-EST. PATIENT-LVL III: CPT | Mod: PBBFAC,,, | Performed by: PHYSICIAN ASSISTANT

## 2021-04-07 PROCEDURE — 99999 PR PBB SHADOW E&M-EST. PATIENT-LVL III: ICD-10-PCS | Mod: PBBFAC,,, | Performed by: PHYSICIAN ASSISTANT

## 2021-04-07 RX ORDER — ATOVAQUONE AND PROGUANIL HYDROCHLORIDE 250; 100 MG/1; MG/1
1 TABLET, FILM COATED ORAL DAILY
Qty: 30 TABLET | Refills: 0 | Status: SHIPPED | OUTPATIENT
Start: 2021-04-07 | End: 2021-05-07

## 2021-04-07 RX ORDER — AZITHROMYCIN 500 MG/1
500 TABLET, FILM COATED ORAL DAILY
Qty: 3 TABLET | Refills: 0 | Status: SHIPPED | OUTPATIENT
Start: 2021-04-07 | End: 2021-04-10

## 2021-04-13 ENCOUNTER — IMMUNIZATION (OUTPATIENT)
Dept: OBSTETRICS AND GYNECOLOGY | Facility: CLINIC | Age: 36
End: 2021-04-13
Payer: COMMERCIAL

## 2021-04-13 DIAGNOSIS — Z23 NEED FOR VACCINATION: Primary | ICD-10-CM

## 2021-04-13 PROCEDURE — 0002A COVID-19, MRNA, LNP-S, PF, 30 MCG/0.3 ML DOSE VACCINE: CPT | Mod: PBBFAC | Performed by: FAMILY MEDICINE

## 2021-04-13 PROCEDURE — 91300 COVID-19, MRNA, LNP-S, PF, 30 MCG/0.3 ML DOSE VACCINE: CPT | Mod: PBBFAC | Performed by: FAMILY MEDICINE

## 2021-04-16 ENCOUNTER — LAB VISIT (OUTPATIENT)
Dept: INTERNAL MEDICINE | Facility: CLINIC | Age: 36
End: 2021-04-16
Payer: COMMERCIAL

## 2021-04-16 DIAGNOSIS — Z11.52 ENCOUNTER FOR SCREENING FOR COVID-19: ICD-10-CM

## 2021-04-16 DIAGNOSIS — Z71.84 TRAVEL ADVICE ENCOUNTER: ICD-10-CM

## 2021-04-16 PROCEDURE — U0003 INFECTIOUS AGENT DETECTION BY NUCLEIC ACID (DNA OR RNA); SEVERE ACUTE RESPIRATORY SYNDROME CORONAVIRUS 2 (SARS-COV-2) (CORONAVIRUS DISEASE [COVID-19]), AMPLIFIED PROBE TECHNIQUE, MAKING USE OF HIGH THROUGHPUT TECHNOLOGIES AS DESCRIBED BY CMS-2020-01-R: HCPCS | Performed by: PHYSICIAN ASSISTANT

## 2021-04-16 PROCEDURE — U0005 INFEC AGEN DETEC AMPLI PROBE: HCPCS | Performed by: PHYSICIAN ASSISTANT

## 2021-04-17 LAB — SARS-COV-2 RNA RESP QL NAA+PROBE: NOT DETECTED

## 2021-12-28 DIAGNOSIS — Z23 NEED FOR VACCINATION: Primary | ICD-10-CM

## 2021-12-28 PROCEDURE — 0004A COVID-19, MRNA, LNP-S, PF, 30 MCG/0.3 ML DOSE VACCINE: CPT | Mod: PBBFAC | Performed by: FAMILY MEDICINE
